# Patient Record
Sex: FEMALE | Race: WHITE | NOT HISPANIC OR LATINO | URBAN - METROPOLITAN AREA
[De-identification: names, ages, dates, MRNs, and addresses within clinical notes are randomized per-mention and may not be internally consistent; named-entity substitution may affect disease eponyms.]

---

## 2017-02-28 ENCOUNTER — OUTPATIENT (OUTPATIENT)
Dept: OUTPATIENT SERVICES | Facility: HOSPITAL | Age: 82
LOS: 1 days | End: 2017-02-28

## 2017-04-05 ENCOUNTER — OUTPATIENT (OUTPATIENT)
Dept: OUTPATIENT SERVICES | Facility: HOSPITAL | Age: 82
LOS: 1 days | End: 2017-04-05

## 2017-04-08 ENCOUNTER — OUTPATIENT (OUTPATIENT)
Dept: OUTPATIENT SERVICES | Facility: HOSPITAL | Age: 82
LOS: 1 days | End: 2017-04-08

## 2017-05-09 ENCOUNTER — OUTPATIENT (OUTPATIENT)
Dept: OUTPATIENT SERVICES | Facility: HOSPITAL | Age: 82
LOS: 1 days | End: 2017-05-09

## 2017-05-11 ENCOUNTER — OUTPATIENT (OUTPATIENT)
Dept: OUTPATIENT SERVICES | Facility: HOSPITAL | Age: 82
LOS: 1 days | End: 2017-05-11

## 2017-05-12 ENCOUNTER — OUTPATIENT (OUTPATIENT)
Dept: OUTPATIENT SERVICES | Facility: HOSPITAL | Age: 82
LOS: 1 days | End: 2017-05-12

## 2017-05-24 ENCOUNTER — OUTPATIENT (OUTPATIENT)
Dept: OUTPATIENT SERVICES | Facility: HOSPITAL | Age: 82
LOS: 1 days | End: 2017-05-24

## 2017-05-25 ENCOUNTER — OUTPATIENT (OUTPATIENT)
Dept: OUTPATIENT SERVICES | Facility: HOSPITAL | Age: 82
LOS: 1 days | End: 2017-05-25

## 2017-05-30 ENCOUNTER — OUTPATIENT (OUTPATIENT)
Dept: OUTPATIENT SERVICES | Facility: HOSPITAL | Age: 82
LOS: 1 days | End: 2017-05-30

## 2017-06-12 ENCOUNTER — OUTPATIENT (OUTPATIENT)
Dept: OUTPATIENT SERVICES | Facility: HOSPITAL | Age: 82
LOS: 1 days | End: 2017-06-12

## 2017-06-13 ENCOUNTER — OUTPATIENT (OUTPATIENT)
Dept: OUTPATIENT SERVICES | Facility: HOSPITAL | Age: 82
LOS: 1 days | End: 2017-06-13

## 2017-06-15 ENCOUNTER — OUTPATIENT (OUTPATIENT)
Dept: OUTPATIENT SERVICES | Facility: HOSPITAL | Age: 82
LOS: 1 days | End: 2017-06-15

## 2017-06-27 ENCOUNTER — OUTPATIENT (OUTPATIENT)
Dept: OUTPATIENT SERVICES | Facility: HOSPITAL | Age: 82
LOS: 1 days | End: 2017-06-27

## 2017-07-14 ENCOUNTER — OUTPATIENT (OUTPATIENT)
Dept: OUTPATIENT SERVICES | Facility: HOSPITAL | Age: 82
LOS: 1 days | End: 2017-07-14

## 2017-07-18 ENCOUNTER — OUTPATIENT (OUTPATIENT)
Dept: OUTPATIENT SERVICES | Facility: HOSPITAL | Age: 82
LOS: 1 days | End: 2017-07-18

## 2017-07-19 ENCOUNTER — APPOINTMENT (OUTPATIENT)
Dept: CARDIOLOGY | Facility: CLINIC | Age: 82
End: 2017-07-19

## 2017-07-19 ENCOUNTER — INPATIENT (INPATIENT)
Facility: HOSPITAL | Age: 82
LOS: 2 days | Discharge: NURSING, REHAB OR LONG-TERM CARE | End: 2017-07-22
Payer: MEDICARE

## 2017-07-19 ENCOUNTER — OUTPATIENT (OUTPATIENT)
Dept: OUTPATIENT SERVICES | Facility: HOSPITAL | Age: 82
LOS: 1 days | End: 2017-07-19

## 2017-07-19 PROCEDURE — 93971 EXTREMITY STUDY: CPT | Mod: 26,RT

## 2017-07-19 PROCEDURE — 71010: CPT | Mod: 26

## 2017-07-19 PROCEDURE — 99285 EMERGENCY DEPT VISIT HI MDM: CPT

## 2017-07-20 ENCOUNTER — OUTPATIENT (OUTPATIENT)
Dept: OUTPATIENT SERVICES | Facility: HOSPITAL | Age: 82
LOS: 1 days | End: 2017-07-20

## 2017-07-20 PROCEDURE — 99233 SBSQ HOSP IP/OBS HIGH 50: CPT

## 2017-07-21 ENCOUNTER — OUTPATIENT (OUTPATIENT)
Dept: OUTPATIENT SERVICES | Facility: HOSPITAL | Age: 82
LOS: 1 days | End: 2017-07-21

## 2017-07-21 PROCEDURE — 99232 SBSQ HOSP IP/OBS MODERATE 35: CPT

## 2017-07-22 ENCOUNTER — OUTPATIENT (OUTPATIENT)
Dept: OUTPATIENT SERVICES | Facility: HOSPITAL | Age: 82
LOS: 1 days | End: 2017-07-22

## 2017-07-22 PROCEDURE — 99232 SBSQ HOSP IP/OBS MODERATE 35: CPT

## 2017-07-25 ENCOUNTER — OUTPATIENT (OUTPATIENT)
Dept: OUTPATIENT SERVICES | Facility: HOSPITAL | Age: 82
LOS: 1 days | End: 2017-07-25

## 2017-07-26 ENCOUNTER — OUTPATIENT (OUTPATIENT)
Dept: OUTPATIENT SERVICES | Facility: HOSPITAL | Age: 82
LOS: 1 days | End: 2017-07-26

## 2017-07-31 ENCOUNTER — APPOINTMENT (OUTPATIENT)
Dept: CARDIOLOGY | Facility: CLINIC | Age: 82
End: 2017-07-31
Payer: MEDICARE

## 2017-07-31 PROCEDURE — 99215 OFFICE O/P EST HI 40 MIN: CPT

## 2017-07-31 PROCEDURE — 93308 TTE F-UP OR LMTD: CPT

## 2017-08-01 ENCOUNTER — OUTPATIENT (OUTPATIENT)
Dept: OUTPATIENT SERVICES | Facility: HOSPITAL | Age: 82
LOS: 1 days | End: 2017-08-01

## 2017-08-15 ENCOUNTER — APPOINTMENT (OUTPATIENT)
Dept: CARDIOLOGY | Facility: CLINIC | Age: 82
End: 2017-08-15
Payer: MEDICARE

## 2017-08-15 PROCEDURE — 99214 OFFICE O/P EST MOD 30 MIN: CPT

## 2017-08-25 ENCOUNTER — OUTPATIENT (OUTPATIENT)
Dept: OUTPATIENT SERVICES | Facility: HOSPITAL | Age: 82
LOS: 1 days | End: 2017-08-25

## 2017-08-29 ENCOUNTER — OUTPATIENT (OUTPATIENT)
Dept: OUTPATIENT SERVICES | Facility: HOSPITAL | Age: 82
LOS: 1 days | End: 2017-08-29

## 2017-08-30 ENCOUNTER — OUTPATIENT (OUTPATIENT)
Dept: OUTPATIENT SERVICES | Facility: HOSPITAL | Age: 82
LOS: 1 days | End: 2017-08-30

## 2017-09-01 ENCOUNTER — OUTPATIENT (OUTPATIENT)
Dept: OUTPATIENT SERVICES | Facility: HOSPITAL | Age: 82
LOS: 1 days | End: 2017-09-01

## 2017-09-07 ENCOUNTER — OUTPATIENT (OUTPATIENT)
Dept: OUTPATIENT SERVICES | Facility: HOSPITAL | Age: 82
LOS: 1 days | End: 2017-09-07

## 2017-09-21 ENCOUNTER — OUTPATIENT (OUTPATIENT)
Dept: OUTPATIENT SERVICES | Facility: HOSPITAL | Age: 82
LOS: 1 days | End: 2017-09-21

## 2017-09-27 ENCOUNTER — APPOINTMENT (OUTPATIENT)
Dept: CARDIOLOGY | Facility: CLINIC | Age: 82
End: 2017-09-27
Payer: MEDICARE

## 2017-09-27 PROCEDURE — 99214 OFFICE O/P EST MOD 30 MIN: CPT

## 2017-09-28 ENCOUNTER — OUTPATIENT (OUTPATIENT)
Dept: OUTPATIENT SERVICES | Facility: HOSPITAL | Age: 82
LOS: 1 days | End: 2017-09-28

## 2017-10-06 ENCOUNTER — OUTPATIENT (OUTPATIENT)
Dept: OUTPATIENT SERVICES | Facility: HOSPITAL | Age: 82
LOS: 1 days | End: 2017-10-06

## 2017-10-18 ENCOUNTER — OUTPATIENT (OUTPATIENT)
Dept: OUTPATIENT SERVICES | Facility: HOSPITAL | Age: 82
LOS: 1 days | End: 2017-10-18

## 2017-10-26 ENCOUNTER — OUTPATIENT (OUTPATIENT)
Dept: OUTPATIENT SERVICES | Facility: HOSPITAL | Age: 82
LOS: 1 days | End: 2017-10-26

## 2017-10-31 ENCOUNTER — APPOINTMENT (OUTPATIENT)
Dept: CARDIOLOGY | Facility: CLINIC | Age: 82
End: 2017-10-31
Payer: MEDICARE

## 2017-10-31 PROCEDURE — 99214 OFFICE O/P EST MOD 30 MIN: CPT

## 2017-11-02 PROCEDURE — 93224 XTRNL ECG REC UP TO 48 HRS: CPT

## 2017-11-14 ENCOUNTER — OUTPATIENT (OUTPATIENT)
Dept: OUTPATIENT SERVICES | Facility: HOSPITAL | Age: 82
LOS: 1 days | End: 2017-11-14

## 2017-11-16 ENCOUNTER — OUTPATIENT (OUTPATIENT)
Dept: OUTPATIENT SERVICES | Facility: HOSPITAL | Age: 82
LOS: 1 days | End: 2017-11-16

## 2017-11-23 ENCOUNTER — OUTPATIENT (OUTPATIENT)
Dept: OUTPATIENT SERVICES | Facility: HOSPITAL | Age: 82
LOS: 1 days | End: 2017-11-23

## 2017-11-28 ENCOUNTER — OUTPATIENT (OUTPATIENT)
Dept: OUTPATIENT SERVICES | Facility: HOSPITAL | Age: 82
LOS: 1 days | End: 2017-11-28

## 2018-01-04 ENCOUNTER — APPOINTMENT (OUTPATIENT)
Dept: CARDIOLOGY | Facility: CLINIC | Age: 83
End: 2018-01-04

## 2018-01-23 ENCOUNTER — OUTPATIENT (OUTPATIENT)
Dept: OUTPATIENT SERVICES | Facility: HOSPITAL | Age: 83
LOS: 1 days | End: 2018-01-23

## 2018-01-23 ENCOUNTER — APPOINTMENT (OUTPATIENT)
Dept: CARDIOLOGY | Facility: CLINIC | Age: 83
End: 2018-01-23

## 2018-02-13 ENCOUNTER — APPOINTMENT (OUTPATIENT)
Dept: CARDIOLOGY | Facility: CLINIC | Age: 83
End: 2018-02-13
Payer: MEDICARE

## 2018-02-13 ENCOUNTER — RECORD ABSTRACTING (OUTPATIENT)
Age: 83
End: 2018-02-13

## 2018-02-13 VITALS
HEART RATE: 72 BPM | DIASTOLIC BLOOD PRESSURE: 60 MMHG | BODY MASS INDEX: 17.93 KG/M2 | HEIGHT: 64 IN | OXYGEN SATURATION: 97 % | SYSTOLIC BLOOD PRESSURE: 110 MMHG | RESPIRATION RATE: 18 BRPM | WEIGHT: 105 LBS

## 2018-02-13 DIAGNOSIS — I48.2 CHRONIC ATRIAL FIBRILLATION: ICD-10-CM

## 2018-02-13 DIAGNOSIS — R07.89 OTHER CHEST PAIN: ICD-10-CM

## 2018-02-13 DIAGNOSIS — Z78.9 OTHER SPECIFIED HEALTH STATUS: ICD-10-CM

## 2018-02-13 DIAGNOSIS — R94.31 ABNORMAL ELECTROCARDIOGRAM [ECG] [EKG]: ICD-10-CM

## 2018-02-13 DIAGNOSIS — Z86.79 PERSONAL HISTORY OF OTHER DISEASES OF THE CIRCULATORY SYSTEM: ICD-10-CM

## 2018-02-13 DIAGNOSIS — Z87.891 PERSONAL HISTORY OF NICOTINE DEPENDENCE: ICD-10-CM

## 2018-02-13 DIAGNOSIS — Z82.49 FAMILY HISTORY OF ISCHEMIC HEART DISEASE AND OTHER DISEASES OF THE CIRCULATORY SYSTEM: ICD-10-CM

## 2018-02-13 DIAGNOSIS — R06.02 SHORTNESS OF BREATH: ICD-10-CM

## 2018-02-13 DIAGNOSIS — Z87.898 PERSONAL HISTORY OF OTHER SPECIFIED CONDITIONS: ICD-10-CM

## 2018-02-13 DIAGNOSIS — I50.32 CHRONIC DIASTOLIC (CONGESTIVE) HEART FAILURE: ICD-10-CM

## 2018-02-13 DIAGNOSIS — Z86.73 PERSONAL HISTORY OF TRANSIENT ISCHEMIC ATTACK (TIA), AND CEREBRAL INFARCTION W/OUT RESIDUAL DEFICITS: ICD-10-CM

## 2018-02-13 PROCEDURE — 99214 OFFICE O/P EST MOD 30 MIN: CPT

## 2018-02-13 RX ORDER — ACETAMINOPHEN 325 MG/1
325 TABLET ORAL
Refills: 0 | Status: ACTIVE | COMMUNITY

## 2018-02-13 RX ORDER — INSULIN GLARGINE 100 [IU]/ML
100 INJECTION, SOLUTION SUBCUTANEOUS
Refills: 0 | Status: ACTIVE | COMMUNITY

## 2018-02-13 RX ORDER — MELATONIN 3 MG
3 CAPSULE ORAL
Refills: 0 | Status: ACTIVE | COMMUNITY

## 2018-02-13 RX ORDER — INSULIN LISPRO 100 [IU]/ML
100 INJECTION, SOLUTION INTRAVENOUS; SUBCUTANEOUS
Refills: 0 | Status: ACTIVE | COMMUNITY

## 2018-02-13 RX ORDER — BECLOMETHASONE DIPROPIONATE HFA 80 UG/1
80 AEROSOL, METERED RESPIRATORY (INHALATION)
Refills: 0 | Status: ACTIVE | COMMUNITY

## 2018-02-13 RX ORDER — APIXABAN 5 MG/1
5 TABLET, FILM COATED ORAL TWICE DAILY
Refills: 0 | Status: ACTIVE | COMMUNITY

## 2018-02-13 RX ORDER — FUROSEMIDE 20 MG/1
20 TABLET ORAL
Refills: 0 | Status: ACTIVE | COMMUNITY

## 2018-02-13 RX ORDER — POTASSIUM CHLORIDE 750 MG/1
10 TABLET, FILM COATED, EXTENDED RELEASE ORAL
Refills: 0 | Status: ACTIVE | COMMUNITY

## 2018-02-27 ENCOUNTER — OUTPATIENT (OUTPATIENT)
Dept: OUTPATIENT SERVICES | Facility: HOSPITAL | Age: 83
LOS: 1 days | End: 2018-02-27

## 2018-03-12 ENCOUNTER — APPOINTMENT (OUTPATIENT)
Dept: CARDIOLOGY | Facility: CLINIC | Age: 83
End: 2018-03-12

## 2018-03-22 ENCOUNTER — APPOINTMENT (OUTPATIENT)
Dept: CARDIOLOGY | Facility: CLINIC | Age: 83
End: 2018-03-22
Payer: MEDICARE

## 2018-03-22 PROCEDURE — 93306 TTE W/DOPPLER COMPLETE: CPT

## 2018-04-12 ENCOUNTER — OUTPATIENT (OUTPATIENT)
Dept: OUTPATIENT SERVICES | Facility: HOSPITAL | Age: 83
LOS: 1 days | End: 2018-04-12

## 2018-04-21 ENCOUNTER — OUTPATIENT (OUTPATIENT)
Dept: OUTPATIENT SERVICES | Facility: HOSPITAL | Age: 83
LOS: 1 days | End: 2018-04-21

## 2018-04-23 ENCOUNTER — OUTPATIENT (OUTPATIENT)
Dept: OUTPATIENT SERVICES | Facility: HOSPITAL | Age: 83
LOS: 1 days | End: 2018-04-23

## 2018-04-23 ENCOUNTER — INPATIENT (INPATIENT)
Facility: HOSPITAL | Age: 83
LOS: 2 days | Discharge: NURSING, REHAB OR LONG-TERM CARE | End: 2018-04-26
Payer: MEDICARE

## 2018-04-23 PROCEDURE — 99223 1ST HOSP IP/OBS HIGH 75: CPT

## 2018-04-23 PROCEDURE — 71045 X-RAY EXAM CHEST 1 VIEW: CPT | Mod: 26

## 2018-04-23 PROCEDURE — 99285 EMERGENCY DEPT VISIT HI MDM: CPT

## 2018-04-24 ENCOUNTER — OUTPATIENT (OUTPATIENT)
Dept: OUTPATIENT SERVICES | Facility: HOSPITAL | Age: 83
LOS: 1 days | End: 2018-04-24

## 2018-04-24 PROCEDURE — 71045 X-RAY EXAM CHEST 1 VIEW: CPT | Mod: 26

## 2018-04-25 ENCOUNTER — APPOINTMENT (OUTPATIENT)
Dept: CARDIOLOGY | Facility: CLINIC | Age: 83
End: 2018-04-25

## 2018-04-25 PROCEDURE — 99233 SBSQ HOSP IP/OBS HIGH 50: CPT

## 2018-04-26 ENCOUNTER — OUTPATIENT (OUTPATIENT)
Dept: OUTPATIENT SERVICES | Facility: HOSPITAL | Age: 83
LOS: 1 days | End: 2018-04-26

## 2018-04-27 ENCOUNTER — OUTPATIENT (OUTPATIENT)
Dept: OUTPATIENT SERVICES | Facility: HOSPITAL | Age: 83
LOS: 1 days | End: 2018-04-27

## 2018-04-27 ENCOUNTER — INPATIENT (INPATIENT)
Facility: HOSPITAL | Age: 83
LOS: 0 days | Discharge: ANOTHER HOSPITAL | End: 2018-04-28
Payer: MEDICARE

## 2018-04-27 PROCEDURE — 71045 X-RAY EXAM CHEST 1 VIEW: CPT | Mod: 26

## 2018-04-27 PROCEDURE — 76937 US GUIDE VASCULAR ACCESS: CPT | Mod: 26

## 2018-04-27 PROCEDURE — 99285 EMERGENCY DEPT VISIT HI MDM: CPT | Mod: 25

## 2018-04-27 PROCEDURE — 99222 1ST HOSP IP/OBS MODERATE 55: CPT

## 2018-04-27 PROCEDURE — 36000 PLACE NEEDLE IN VEIN: CPT

## 2018-04-28 ENCOUNTER — OUTPATIENT (OUTPATIENT)
Dept: OUTPATIENT SERVICES | Facility: HOSPITAL | Age: 83
LOS: 1 days | End: 2018-04-28

## 2018-04-28 ENCOUNTER — INPATIENT (INPATIENT)
Facility: HOSPITAL | Age: 83
LOS: 3 days | Discharge: STILL ADMITTED OR ADMINISTRATIVE | DRG: 242 | End: 2018-05-02
Attending: INTERNAL MEDICINE | Admitting: HOSPITALIST
Payer: MEDICARE

## 2018-04-28 VITALS
WEIGHT: 108.91 LBS | OXYGEN SATURATION: 97 % | TEMPERATURE: 98 F | RESPIRATION RATE: 18 BRPM | HEART RATE: 49 BPM | DIASTOLIC BLOOD PRESSURE: 99 MMHG | SYSTOLIC BLOOD PRESSURE: 191 MMHG | HEIGHT: 64 IN

## 2018-04-28 DIAGNOSIS — R00.1 BRADYCARDIA, UNSPECIFIED: ICD-10-CM

## 2018-04-28 DIAGNOSIS — N18.9 CHRONIC KIDNEY DISEASE, UNSPECIFIED: ICD-10-CM

## 2018-04-28 DIAGNOSIS — I48.91 UNSPECIFIED ATRIAL FIBRILLATION: ICD-10-CM

## 2018-04-28 DIAGNOSIS — I16.1 HYPERTENSIVE EMERGENCY: ICD-10-CM

## 2018-04-28 LAB
ALBUMIN SERPL ELPH-MCNC: 3.3 G/DL — SIGNIFICANT CHANGE UP (ref 3.3–5.2)
ALP SERPL-CCNC: 74 U/L — SIGNIFICANT CHANGE UP (ref 40–120)
ALT FLD-CCNC: 28 U/L — SIGNIFICANT CHANGE UP
ANION GAP SERPL CALC-SCNC: 14 MMOL/L — SIGNIFICANT CHANGE UP (ref 5–17)
AST SERPL-CCNC: 28 U/L — SIGNIFICANT CHANGE UP
BASOPHILS # BLD AUTO: 0 K/UL — SIGNIFICANT CHANGE UP (ref 0–0.2)
BASOPHILS NFR BLD AUTO: 0.1 % — SIGNIFICANT CHANGE UP (ref 0–2)
BILIRUB SERPL-MCNC: 0.8 MG/DL — SIGNIFICANT CHANGE UP (ref 0.4–2)
BUN SERPL-MCNC: 38 MG/DL — HIGH (ref 8–20)
CALCIUM SERPL-MCNC: 9.2 MG/DL — SIGNIFICANT CHANGE UP (ref 8.6–10.2)
CHLORIDE SERPL-SCNC: 103 MMOL/L — SIGNIFICANT CHANGE UP (ref 98–107)
CO2 SERPL-SCNC: 19 MMOL/L — LOW (ref 22–29)
CREAT SERPL-MCNC: 1.15 MG/DL — SIGNIFICANT CHANGE UP (ref 0.5–1.3)
EOSINOPHIL # BLD AUTO: 0.2 K/UL — SIGNIFICANT CHANGE UP (ref 0–0.5)
EOSINOPHIL NFR BLD AUTO: 2.6 % — SIGNIFICANT CHANGE UP (ref 0–6)
GLUCOSE SERPL-MCNC: 135 MG/DL — HIGH (ref 70–115)
HCT VFR BLD CALC: 35.7 % — LOW (ref 37–47)
HGB BLD-MCNC: 11.5 G/DL — LOW (ref 12–16)
LYMPHOCYTES # BLD AUTO: 1.6 K/UL — SIGNIFICANT CHANGE UP (ref 1–4.8)
LYMPHOCYTES # BLD AUTO: 20.4 % — SIGNIFICANT CHANGE UP (ref 20–55)
MAGNESIUM SERPL-MCNC: 2.4 MG/DL — SIGNIFICANT CHANGE UP (ref 1.6–2.6)
MCHC RBC-ENTMCNC: 30 PG — SIGNIFICANT CHANGE UP (ref 27–31)
MCHC RBC-ENTMCNC: 32.2 G/DL — SIGNIFICANT CHANGE UP (ref 32–36)
MCV RBC AUTO: 93.2 FL — SIGNIFICANT CHANGE UP (ref 81–99)
MONOCYTES # BLD AUTO: 0.9 K/UL — HIGH (ref 0–0.8)
MONOCYTES NFR BLD AUTO: 11 % — HIGH (ref 3–10)
NEUTROPHILS # BLD AUTO: 5.2 K/UL — SIGNIFICANT CHANGE UP (ref 1.8–8)
NEUTROPHILS NFR BLD AUTO: 65.6 % — SIGNIFICANT CHANGE UP (ref 37–73)
NT-PROBNP SERPL-SCNC: HIGH PG/ML (ref 0–300)
PLATELET # BLD AUTO: 153 K/UL — SIGNIFICANT CHANGE UP (ref 150–400)
POTASSIUM SERPL-MCNC: 4.8 MMOL/L — SIGNIFICANT CHANGE UP (ref 3.5–5.3)
POTASSIUM SERPL-SCNC: 4.8 MMOL/L — SIGNIFICANT CHANGE UP (ref 3.5–5.3)
PROT SERPL-MCNC: 5.6 G/DL — LOW (ref 6.6–8.7)
RBC # BLD: 3.83 M/UL — LOW (ref 4.4–5.2)
RBC # FLD: 16.4 % — HIGH (ref 11–15.6)
SODIUM SERPL-SCNC: 136 MMOL/L — SIGNIFICANT CHANGE UP (ref 135–145)
T4 AB SER-ACNC: 6.3 UG/DL — SIGNIFICANT CHANGE UP (ref 4.5–12)
TROPONIN T SERPL-MCNC: 0.03 NG/ML — SIGNIFICANT CHANGE UP (ref 0–0.06)
TSH SERPL-MCNC: 8.43 UIU/ML — HIGH (ref 0.27–4.2)
WBC # BLD: 7.9 K/UL — SIGNIFICANT CHANGE UP (ref 4.8–10.8)
WBC # FLD AUTO: 7.9 K/UL — SIGNIFICANT CHANGE UP (ref 4.8–10.8)

## 2018-04-28 PROCEDURE — 99223 1ST HOSP IP/OBS HIGH 75: CPT

## 2018-04-28 PROCEDURE — 93971 EXTREMITY STUDY: CPT | Mod: 26,RT

## 2018-04-28 PROCEDURE — 70450 CT HEAD/BRAIN W/O DYE: CPT | Mod: 26

## 2018-04-28 PROCEDURE — 99285 EMERGENCY DEPT VISIT HI MDM: CPT

## 2018-04-28 RX ORDER — PANTOPRAZOLE SODIUM 20 MG/1
40 TABLET, DELAYED RELEASE ORAL
Qty: 0 | Refills: 0 | Status: DISCONTINUED | OUTPATIENT
Start: 2018-04-28 | End: 2018-05-02

## 2018-04-28 RX ORDER — NITROGLYCERIN 6.5 MG
1 CAPSULE, EXTENDED RELEASE ORAL DAILY
Qty: 0 | Refills: 0 | Status: DISCONTINUED | OUTPATIENT
Start: 2018-04-28 | End: 2018-04-29

## 2018-04-28 RX ORDER — HYDRALAZINE HCL 50 MG
50 TABLET ORAL EVERY 8 HOURS
Qty: 0 | Refills: 0 | Status: DISCONTINUED | OUTPATIENT
Start: 2018-04-28 | End: 2018-04-29

## 2018-04-28 RX ORDER — HYDRALAZINE HCL 50 MG
10 TABLET ORAL EVERY 4 HOURS
Qty: 0 | Refills: 0 | Status: DISCONTINUED | OUTPATIENT
Start: 2018-04-28 | End: 2018-05-02

## 2018-04-28 RX ORDER — ASPIRIN/CALCIUM CARB/MAGNESIUM 324 MG
325 TABLET ORAL DAILY
Qty: 0 | Refills: 0 | Status: DISCONTINUED | OUTPATIENT
Start: 2018-04-28 | End: 2018-04-28

## 2018-04-28 RX ORDER — HYDRALAZINE HCL 50 MG
10 TABLET ORAL ONCE
Qty: 0 | Refills: 0 | Status: COMPLETED | OUTPATIENT
Start: 2018-04-28 | End: 2018-04-28

## 2018-04-28 RX ADMIN — Medication 10 MILLIGRAM(S): at 19:40

## 2018-04-28 NOTE — H&P ADULT - PROBLEM SELECTOR PLAN 2
will give hydralazine 50 mg tid , iv hydralzine prn   nitropaste 1 inch to Chest wall   will obtain CT of head r/o bleed

## 2018-04-28 NOTE — ED PROVIDER NOTE - PMH
Atrial fibrillation    Bradycardia    CHF (congestive heart failure)    CKD (chronic kidney disease)    CVA (cerebral vascular accident)    Depression    DM (diabetes mellitus)    HLD (hyperlipidemia)    HTN (hypertension)    Syst congestive heart failure with reduced LV function, NYHA class 1

## 2018-04-28 NOTE — ED ADULT NURSE NOTE - CHIEF COMPLAINT QUOTE
Pt BIBA, transfer from [facility] for pacemaker placement. Dr. Birmingham is accepting cardio, Yaya accepting hospitalist. Pt has refused PPM in the past and has MOLST form with her upon ED arrival. Denies any chest pain or sob, respirations even and unlabored. Pt hypertensive in triage, rec'd 25mg hydralazine PO and Nitro paste prior to transfer to [facility].

## 2018-04-28 NOTE — ED PROVIDER NOTE - MEDICAL DECISION MAKING DETAILS
discussed with health care proxy and family; unclear to me at this time if a PPM is in patient's best interest, does not sound to me as though family has clear understanding of all implications of DNR/DNI status or of the potential for comfort care; I explained these items in detail to them, and they will need to consider further a PPM procedure; will admit to monitored setting, treat BP; cards consulted

## 2018-04-28 NOTE — ED ADULT NURSE NOTE - ED STAT RN HANDOFF DETAILS
report given to kerry ulrich, pt stable for transport on cm with , pt transported with rn bedside, KERRY ulrich expressing concern and requesting this rn to call admitting md d/t pt meeting admission criteria for MICU, md yusuf notified, pt to remain on 4brk per MD Yusuf

## 2018-04-28 NOTE — H&P ADULT - ASSESSMENT
89 yo female with h/o Atrial fibrillation , HTN , CHF , dementia transferred  from [facility] for symptomatic  anemia for PPM insertion by DR Mcmanus on monday , pt is with hypertensive emergency   DNR/DNI per SERINA

## 2018-04-28 NOTE — ED PROVIDER NOTE - OBJECTIVE STATEMENT
89 yo female transferred from [place] for symptomatic bradycardia 89 yo female transferred from [facility] for symptomatic bradycardia; patient in long term nursing care facility, has apparently had several bouts of symptomatic bradycardia in the past, but has refused PPM up to this point; apparently symptoms became more profound over last week or so, was admitted to [facility], and now family is agreeable to PPM; patient is DNR DNI as per MOLST form; also with some dementia, health care proxy able to be reached for easily; patient on arrival to our facility has no subjective complaints; accepted for transfer by hospitalist and Dr Mcmanus EP

## 2018-04-28 NOTE — CONSULT NOTE ADULT - SUBJECTIVE AND OBJECTIVE BOX
[facility] Practice                                                        Office: [address] [address]                                                       Telephone: [phone]. Fax:[phone]    Patient is a 89y old  Female who presents with a chief complaint of symptomatic bradycardia for PPM (28 Apr 2018 19:26)      HPI: 89 yo female with h/o CVA , HFPEF (per records, LVEF 55% and mild to mod MR on echo 3/2018), Atrial fibrillation on eliquis , wheelchair bound due to neuropathy, HTN transferred from [facility] for PPM on monday per dr Mcmanus , she was admitted to the hospital  last week apparently for bradycardia and  elevated BP , she had refused PPM and discharged to NH ( she is there long term ) , However pt had recurrent symptoms weakness dizziness and was admitted to the hospital yesterday for symptomatic bardycardia . HRs in the 40s with hypertensive urgency.  Now agreeable to PPM.   Pt is with -190's given hydralazine 25 mg po and Nitropaste ointment . Pt is forgetful has mild dementia per niece who is at the bedside , she has MOLTS form signed by her granddaughter Tiffanie .     Vital Signs Last 24 Hrs  T(C): 36.7 (28 Apr 2018 19:26), Max: 36.7 (28 Apr 2018 17:47)  T(F): 98 (28 Apr 2018 19:26), Max: 98 (28 Apr 2018 17:47)  HR: 44 (28 Apr 2018 19:26) (44 - 49)  BP: 219/92 (28 Apr 2018 19:26) (191/99 - 219/92)  BP(mean): --  RR: 18 (28 Apr 2018 19:26) (18 - 18)  SpO2: 98% (28 Apr 2018 19:26) (97% - 98%) (28 Apr 2018 19:26)    PAST MEDICAL & SURGICAL HISTORY:  CKD (chronic kidney disease)  Bradycardia  DM (diabetes mellitus)  CVA (cerebral vascular accident)  HTN (hypertension)  CHF (congestive heart failure)  Atrial fibrillation  HLD (hyperlipidemia)  Depression  Syst congestive heart failure with reduced LV function, NYHA class 1  No significant past surgical history      Allergies  No Known Allergies  Intolerances    MEDICATIONS  (STANDING):  hydrALAZINE 50 milliGRAM(s) Oral every 8 hours  nitroglycerin    2% Ointment 1 Inch(s) Transdermal daily  pantoprazole    Tablet 40 milliGRAM(s) Oral before breakfast    MEDICATIONS  (PRN):  cloNIDine 0.1 milliGRAM(s) Oral two times a day PRN BP>160/100  hydrALAZINE Injectable 10 milliGRAM(s) IV Push every 4 hours PRN for SBP > 160 DBP > 95      FAMILY HISTORY:  No pertinent family history in first degree relatives    SOCIAL HISTORY: Denies tobacco/etoh/illicit drug use, lives in SNF, wheelchair bound    PREVIOUS DIAGNOSTIC TESTING:  NONE IN OUR SYSTEM    REVIEW OF SYSTEMS:  CONSTITUTIONAL: [-]fever   [-] weight loss   [+] fatigue  EYES: [-]  eye pain   [-] visual disturbances      [-]  discharge  ENMT:  [-]  difficulty hearing,   [-]  tinnitus   [-] vertigo    [-]  sinus or throat pain  NECK: [-]  pain or stiffness  RESPIRATORY: [-]  cough 	[-] wheezing 	[-]  hemoptysis 		[-]   Shortness of Breath  CARDIOVASCULAR: [-]  chest pain	[-] palpitations		[-]  passing out 		[+] dizziness 	[-]  leg swelling  		[-]  PND 	[-] orthopnea  GASTROINTESTINAL: [-]  abdominal pain		[-]nausea	[-] vomiting	[-]  hematemesis 	[-]  diarrhea  	[-] constipation 		[-]  melena 	[-] hematochezia.  GENITOURINARY: [-] dysuria	[+] frequency	[-] hematuria	[-]  incontinence  NEUROLOGICAL: [-]  headaches		[-] memory loss 	[-]  loss of strength  			[-]  numbness/tingling 	[-]  tremors  SKIN: [-]  itching 	[-] rashes 	[-]  lesions   LYMPH Nodes: [-] enlarged glands  ENDOCRINE: [-] heat or cold intolerance 	[-]   hair loss  MUSCULOSKELETAL: [-] joint pain  [-] joint swelling	[-]  muscle, back, or extremity pain  PSYCHIATRIC: [-]  depression	[-] anxiety	[-] mood swings		[-]  difficulty sleeping   HEME: [-]  easy bruising 	[-]  bleeding   ALLERY AND IMMUNOLOGIC: [-]  hives or eczema	      Vital Signs Last 24 Hrs  T(C): 36.7 (28 Apr 2018 19:26), Max: 36.7 (28 Apr 2018 17:47)  T(F): 98 (28 Apr 2018 19:26), Max: 98 (28 Apr 2018 17:47)  HR: 46 (28 Apr 2018 21:20) (42 - 49)  BP: 183/77 (28 Apr 2018 21:20) (183/77 - 219/92)  BP(mean): --  RR: 18 (28 Apr 2018 19:26) (18 - 18)  SpO2: 98% (28 Apr 2018 19:26) (97% - 98%)  Daily Height in cm: 162.56 (28 Apr 2018 17:47)    Daily   I&O's Detail        PHYSICAL EXAM:  Appearance: well nourished, NAD	  HEENT:   Normal oral mucosa, PERRL, EOMI, sclera non-icteric	  Lymphatic: No cervical lymphadenopathy  Cardiovascular: Normal S1 S2, No JVD,III/VI systolic murmur, No carotid bruits,trace bilateral LE edema  Respiratory: trace basilar crackles  Psychiatry: calm, intermittently agitated  Gastrointestinal:  Soft, Non-tender, + BS, no bruits	  Skin: No rashes, No ecchymoses, No cyanosis, Dry  Neurologic: Grossly non-focal with full strength in all four extremities  Extremities: Normal range of motion, No clubbing, cyanosis or edema  Vascular: Peripheral pulses palpable 2+ bilaterally, warm    INTERPRETATION OF TELEMETRY: afib 40s    ECG (tracing reviewed by me): afib 67 bpm,  frequent PVCs - prior ECG with RBBB    LABS:  PENDING  REVIEWED LABS FROM [facility]          BNP  RADIOLOGY & ADDITIONAL STUDIES:  CXR (image reviewed by me): pending [facility] Practice                                                        Office: [address] [address]                                                       Telephone: [phone]. Fax:[phone]    Patient is a 89y old  Female who presents with a chief complaint of symptomatic bradycardia for PPM (28 Apr 2018 19:26)      HPI: 89 yo female with h/o CVA , HFPEF (per records, LVEF 55% and mild to mod MR on echo 3/2018), Atrial fibrillation on eliquis , wheelchair bound due to neuropathy, HTN transferred from [facility] for PPM on monday per dr Mcmanus , she was admitted to the hospital  last week apparently for bradycardia and  elevated BP , she had refused PPM and discharged to NH ( she is there long term ) , However pt had recurrent symptoms weakness dizziness and was admitted to the hospital yesterday for symptomatic bardycardia . HRs in the 40s with hypertensive urgency.  Now agreeable to PPM.   Pt is with -190's given hydralazine 25 mg po and Nitropaste ointment . Pt is forgetful has mild dementia per niece who is at the bedside , she has MOLTS form signed by her granddaughter Tiffanie .     Vital Signs Last 24 Hrs  T(C): 36.7 (28 Apr 2018 19:26), Max: 36.7 (28 Apr 2018 17:47)  T(F): 98 (28 Apr 2018 19:26), Max: 98 (28 Apr 2018 17:47)  HR: 44 (28 Apr 2018 19:26) (44 - 49)  BP: 219/92 (28 Apr 2018 19:26) (191/99 - 219/92)  BP(mean): --  RR: 18 (28 Apr 2018 19:26) (18 - 18)  SpO2: 98% (28 Apr 2018 19:26) (97% - 98%) (28 Apr 2018 19:26)    PAST MEDICAL & SURGICAL HISTORY:  CKD (chronic kidney disease)  Bradycardia  DM (diabetes mellitus)  CVA (cerebral vascular accident)  HTN (hypertension)  CHF (congestive heart failure)  Atrial fibrillation  HLD (hyperlipidemia)  Depression  Syst congestive heart failure with reduced LV function, NYHA class 1  No significant past surgical history      Allergies  No Known Allergies  Intolerances    MEDICATIONS  (STANDING):  hydrALAZINE 50 milliGRAM(s) Oral every 8 hours  nitroglycerin    2% Ointment 1 Inch(s) Transdermal daily  pantoprazole    Tablet 40 milliGRAM(s) Oral before breakfast    MEDICATIONS  (PRN):  cloNIDine 0.1 milliGRAM(s) Oral two times a day PRN BP>160/100  hydrALAZINE Injectable 10 milliGRAM(s) IV Push every 4 hours PRN for SBP > 160 DBP > 95      FAMILY HISTORY:  No pertinent family history in first degree relatives    SOCIAL HISTORY: Denies tobacco/etoh/illicit drug use, lives in SNF, wheelchair bound    PREVIOUS DIAGNOSTIC TESTING:  NONE IN OUR SYSTEM    REVIEW OF SYSTEMS:  CONSTITUTIONAL: [-]fever   [-] weight loss   [+] fatigue  EYES: [-]  eye pain   [-] visual disturbances      [-]  discharge  ENMT:  [-]  difficulty hearing,   [-]  tinnitus   [-] vertigo    [-]  sinus or throat pain  NECK: [-]  pain or stiffness  RESPIRATORY: [-]  cough 	[-] wheezing 	[-]  hemoptysis 		[-]   Shortness of Breath  CARDIOVASCULAR: [-]  chest pain	[-] palpitations		[-]  passing out 		[+] dizziness 	[-]  leg swelling  		[-]  PND 	[-] orthopnea  GASTROINTESTINAL: [-]  abdominal pain		[-]nausea	[-] vomiting	[-]  hematemesis 	[-]  diarrhea  	[-] constipation 		[-]  melena 	[-] hematochezia.  GENITOURINARY: [-] dysuria	[+] frequency	[-] hematuria	[-]  incontinence  NEUROLOGICAL: [-]  headaches		[-] memory loss 	[-]  loss of strength  			[-]  numbness/tingling 	[-]  tremors  SKIN: [-]  itching 	[-] rashes 	[-]  lesions   LYMPH Nodes: [-] enlarged glands  ENDOCRINE: [-] heat or cold intolerance 	[-]   hair loss  MUSCULOSKELETAL: [-] joint pain  [-] joint swelling	[-]  muscle, back, or extremity pain  PSYCHIATRIC: [-]  depression	[-] anxiety	[-] mood swings		[-]  difficulty sleeping   HEME: [-]  easy bruising 	[-]  bleeding   ALLERY AND IMMUNOLOGIC: [-]  hives or eczema	      Vital Signs Last 24 Hrs  T(C): 36.7 (28 Apr 2018 19:26), Max: 36.7 (28 Apr 2018 17:47)  T(F): 98 (28 Apr 2018 19:26), Max: 98 (28 Apr 2018 17:47)  HR: 46 (28 Apr 2018 21:20) (42 - 49)  BP: 183/77 (28 Apr 2018 21:20) (183/77 - 219/92)  BP(mean): --  RR: 18 (28 Apr 2018 19:26) (18 - 18)  SpO2: 98% (28 Apr 2018 19:26) (97% - 98%)  Daily Height in cm: 162.56 (28 Apr 2018 17:47)    Daily   I&O's Detail        PHYSICAL EXAM:  Appearance: well nourished, NAD	  HEENT:   Normal oral mucosa, PERRL, EOMI, sclera non-icteric	  Lymphatic: No cervical lymphadenopathy  Cardiovascular: Normal S1 S2, No JVD,III/VI systolic murmur, No carotid bruits,trace bilateral LE edema  Respiratory: trace basilar crackles  Psychiatry: calm, intermittently agitated  Gastrointestinal:  Soft, Non-tender, + BS, no bruits	  Skin: No rashes, No ecchymoses, No cyanosis, Dry  Neurologic: Grossly non-focal with full strength in all four extremities  Extremities: Normal range of motion, No clubbing, cyanosis or edema  Vascular: Peripheral pulses palpable 2+ bilaterally, warm    INTERPRETATION OF TELEMETRY: afib 40s    ECG (tracing reviewed by me): afib  40s, RBBB    LABS:  PENDING  REVIEWED LABS FROM [facility]          BNP  RADIOLOGY & ADDITIONAL STUDIES:  CXR (image reviewed by me): pending

## 2018-04-28 NOTE — ED ADULT NURSE REASSESSMENT NOTE - NS ED NURSE REASSESS COMMENT FT1
Report received from off going RN, charting as noted. Patient A&Ox4, denies any pain or discomfort. Respirations even & unlabored, denies any numbness or tingling. Cardiac monitor in place, sinus peter. Denies any chest pain, shortness of breath, nausea or dizziness. Saline lock in place, patent, negative s/s phlebitis or infiltration. Ecchymosis to left forearm, stated hurts to touch. Family at bedside. Plan of care discussed, all questions answered. Patient hypertensive, Hydralazine administered as ordered. Will monitor.

## 2018-04-28 NOTE — ED ADULT TRIAGE NOTE - CHIEF COMPLAINT QUOTE
Pt BIBA, transfer from [facility] for pacemaker placement. Dr. Birmingham is accepting cardio, Yaya accepting hospitalist. Pt has refused PPM in the past and has MOLST form with her upon ED arrival. Denies any chest pain or sob, respirations even and unlabored. Pt hypertensive in triage, rec'd 25mg hydralazine PO and Nitro paste prior to transfer to [facility].

## 2018-04-28 NOTE — H&P ADULT - HISTORY OF PRESENT ILLNESS
89 yo female with h/o CVA , Atrial fibrillation on eliquis , HTN transfered from [facility] for PPM on monday per dr Mcmanus , she was admitted to the hospital  last week apparently for bradycardia and  elevated BP , she had refused PPM and discharged to NH ( she is there long term ) , However pt had recurrent symptoms weakness dizziness and was admitted to the hospital yesterday for symptomatic bardycardia . Pt is with -190's given hydralazine 25 mg po and Nitropaste ointment . Pt is forgetful has mild dementia per niece who is at the bedside , she has MOLTS form signed by her granddaughter Tiffanie .   Vital Signs Last 24 Hrs  T(C): 36.7 (28 Apr 2018 19:26), Max: 36.7 (28 Apr 2018 17:47)  T(F): 98 (28 Apr 2018 19:26), Max: 98 (28 Apr 2018 17:47)  HR: 44 (28 Apr 2018 19:26) (44 - 49)  BP: 219/92 (28 Apr 2018 19:26) (191/99 - 219/92)  BP(mean): --  RR: 18 (28 Apr 2018 19:26) (18 - 18)  SpO2: 98% (28 Apr 2018 19:26) (97% - 98%)

## 2018-04-29 DIAGNOSIS — N18.3 CHRONIC KIDNEY DISEASE, STAGE 3 (MODERATE): ICD-10-CM

## 2018-04-29 DIAGNOSIS — I50.9 HEART FAILURE, UNSPECIFIED: ICD-10-CM

## 2018-04-29 DIAGNOSIS — R94.6 ABNORMAL RESULTS OF THYROID FUNCTION STUDIES: ICD-10-CM

## 2018-04-29 PROCEDURE — 36000 PLACE NEEDLE IN VEIN: CPT

## 2018-04-29 PROCEDURE — 99233 SBSQ HOSP IP/OBS HIGH 50: CPT

## 2018-04-29 PROCEDURE — 71045 X-RAY EXAM CHEST 1 VIEW: CPT | Mod: 26

## 2018-04-29 RX ORDER — FUROSEMIDE 40 MG
40 TABLET ORAL ONCE
Qty: 0 | Refills: 0 | Status: COMPLETED | OUTPATIENT
Start: 2018-04-29 | End: 2018-04-29

## 2018-04-29 RX ORDER — ENOXAPARIN SODIUM 100 MG/ML
50 INJECTION SUBCUTANEOUS ONCE
Qty: 0 | Refills: 0 | Status: COMPLETED | OUTPATIENT
Start: 2018-04-29 | End: 2018-04-29

## 2018-04-29 RX ORDER — NITROGLYCERIN 6.5 MG
1 CAPSULE, EXTENDED RELEASE ORAL DAILY
Qty: 0 | Refills: 0 | Status: DISCONTINUED | OUTPATIENT
Start: 2018-04-29 | End: 2018-04-29

## 2018-04-29 RX ORDER — HYDRALAZINE HCL 50 MG
50 TABLET ORAL EVERY 6 HOURS
Qty: 0 | Refills: 0 | Status: DISCONTINUED | OUTPATIENT
Start: 2018-04-29 | End: 2018-05-01

## 2018-04-29 RX ORDER — NITROGLYCERIN 6.5 MG
1 CAPSULE, EXTENDED RELEASE ORAL DAILY
Qty: 0 | Refills: 0 | Status: DISCONTINUED | OUTPATIENT
Start: 2018-04-29 | End: 2018-05-01

## 2018-04-29 RX ORDER — FUROSEMIDE 40 MG
40 TABLET ORAL DAILY
Qty: 0 | Refills: 0 | Status: DISCONTINUED | OUTPATIENT
Start: 2018-04-29 | End: 2018-04-29

## 2018-04-29 RX ORDER — LEVOTHYROXINE SODIUM 125 MCG
75 TABLET ORAL DAILY
Qty: 0 | Refills: 0 | Status: DISCONTINUED | OUTPATIENT
Start: 2018-04-29 | End: 2018-05-02

## 2018-04-29 RX ADMIN — Medication 50 MILLIGRAM(S): at 23:17

## 2018-04-29 RX ADMIN — ENOXAPARIN SODIUM 50 MILLIGRAM(S): 100 INJECTION SUBCUTANEOUS at 18:02

## 2018-04-29 RX ADMIN — Medication 1 INCH(S): at 21:28

## 2018-04-29 RX ADMIN — Medication 40 MILLIGRAM(S): at 17:59

## 2018-04-29 RX ADMIN — Medication 50 MILLIGRAM(S): at 05:15

## 2018-04-29 RX ADMIN — Medication 1 INCH(S): at 09:05

## 2018-04-29 RX ADMIN — Medication 0.1 MILLIGRAM(S): at 04:29

## 2018-04-29 RX ADMIN — Medication 50 MILLIGRAM(S): at 17:59

## 2018-04-29 RX ADMIN — Medication 40 MILLIGRAM(S): at 13:22

## 2018-04-29 RX ADMIN — Medication 10 MILLIGRAM(S): at 18:02

## 2018-04-29 RX ADMIN — Medication 50 MILLIGRAM(S): at 12:39

## 2018-04-29 RX ADMIN — Medication 50 MILLIGRAM(S): at 00:14

## 2018-04-29 RX ADMIN — PANTOPRAZOLE SODIUM 40 MILLIGRAM(S): 20 TABLET, DELAYED RELEASE ORAL at 08:32

## 2018-04-29 RX ADMIN — Medication 75 MICROGRAM(S): at 09:05

## 2018-04-29 RX ADMIN — Medication 10 MILLIGRAM(S): at 16:20

## 2018-04-29 NOTE — ED ADULT NURSE REASSESSMENT NOTE - NS ED NURSE REASSESS COMMENT FT1
Spoke to karrie (healthcare proxy), advised patient has a bed on 4BRK and will be going upstairs after room is clean.

## 2018-04-29 NOTE — ED ADULT NURSE REASSESSMENT NOTE - NS ED NURSE REASSESS COMMENT FT1
Report received from offgoing RN, chart as noted, pt a&ox3 @ this time with intermittent confusion @ baseline, pt peter on cm HR 40's, #20g iv to LUE noted, flushed per policy. Bruising noted to BUE, redness noted to Right arm, RR even and unlabored, on cm, safety maintained, pt updated on bed assignment verbalized understanding, denies cp denies sob pt resting comfortably on stretcher

## 2018-04-29 NOTE — PROGRESS NOTE ADULT - SUBJECTIVE AND OBJECTIVE BOX
[facility] Practice                                                        Office: [address] [address]                                                       Telephone: [phone]. Fax:[phone]      CARDIOLOGY PROGRESS NOTE   ([facility])    Subjective: Patient seen and examined at bedside.  Denies chest pain, shortness of breath, palpitations, dizziness, nausea, bleeding. Hypertension difficult to control.       CURRENT MEDICATIONS  cloNIDine 0.1 milliGRAM(s) Oral two times a day PRN  furosemide   Injectable 40 milliGRAM(s) IV Push daily  hydrALAZINE 50 milliGRAM(s) Oral every 6 hours  hydrALAZINE Injectable 10 milliGRAM(s) IV Push every 4 hours PRN  nitroglycerin    2% Ointment 1 Inch(s) Transdermal daily  pantoprazole    Tablet 40 milliGRAM(s) Oral before breakfast  levothyroxine 75 MICROGram(s) Oral daily  	  TELEMETRY: afib 30s-50s  Vitals:  T(C): 36.6 (04-29-18 @ 08:18), Max: 36.7 (04-28-18 @ 17:47)  HR: 46 (04-29-18 @ 13:22) (36 - 49)  BP: 178/78 (04-29-18 @ 13:22) (174/74 - 219/92)  RR: 18 (04-29-18 @ 13:22) (18 - 20)  SpO2: 99% (04-29-18 @ 13:22) (97% - 100%)  Wt(kg): --  I&O's Summary    Height (cm): 162.56 (04-28 @ 17:47)  Weight (kg): 49.4 (04-28 @ 17:47)  BMI (kg/m2): 18.7 (04-28 @ 17:47)  BSA (m2): 1.51 (04-28 @ 17:47)  Daily T(C): 36.6 (04-29-18 @ 08:18), Max: 36.7 (04-28-18 @ 17:47)  HR: 46 (04-29-18 @ 13:22) (36 - 49)  BP: 178/78 (04-29-18 @ 13:22) (174/74 - 219/92)  RR: 18 (04-29-18 @ 13:22) (18 - 20)  SpO2: 99% (04-29-18 @ 13:22) (97% - 100%)  Wt(kg): --    Daily Height (cm): 162.56 (04-28 @ 17:47)  Weight (kg): 49.4 (04-28 @ 17:47)  BMI (kg/m2): 18.7 (04-28 @ 17:47)  BSA (m2): 1.51 (04-28 @ 17:47)    PHYSICAL EXAM:  Appearance: well nourished, NAD	  HEENT:   Normal oral mucosa, PERRL, EOMI, sclera non-icteric	  Lymphatic: No cervical lymphadenopathy  Cardiovascular: Normal S1 S2, No JVD,III/VI systolic murmur, No carotid bruits, trace bilateral LE edema  Respiratory: trace bibasilar crackles  Psychiatry: calm,   Gastrointestinal:  Soft, Non-tender, + BS, no bruits	  Skin: No rashes, No ecchymoses, No cyanosis, Dry  Neurologic: Grossly non-focal with full strength in all four extremities  Extremities: Normal range of motion, No clubbing, cyanosis or edema  Vascular: Peripheral pulses palpable 2+ bilaterally, warm    DIAGNOSTIC TESTING:    Echocardiogram pending                       11.5   7.9   )-----------( 153      ( 28 Apr 2018 23:02 )             35.7     04-28    136  |  103  |  38.0<H>  ----------------------------<  135<H>  4.8   |  19.0<L>  |  1.15    Ca    9.2      28 Apr 2018 23:02  Mg     2.4     04-28    TPro  5.6<L>  /  Alb  3.3  /  TBili  0.8  /  DBili  x   /  AST  28  /  ALT  28  /  AlkPhos  74  04-28    BNP: Serum Pro-Brain Natriuretic Peptide: 49874 pg/mL (04-28 @ 23:02)    TSH: Thyroid Stimulating Hormone, Serum: 8.43 uIU/mL (04-28 @ 23:02)

## 2018-04-29 NOTE — PROGRESS NOTE ADULT - ASSESSMENT
89 yo female with h/o CVA , HFPEF (per records, LVEF 55% and mild to mod MR on echo 3/2018), Atrial fibrillation on eliquis , wheelchair bound due to neuropathy, HTN transferred from [facility] for PPM on monday per dr Mcmanus , she was admitted to the hospital  last week apparently for bradycardia and  elevated BP , she had refused PPM and discharged to NH ( she is there long term ) , However pt had recurrent symptoms weakness dizziness and was admitted to the hospital yesterday for symptomatic bardycardia . HRs in the 40s with hypertensive urgency.  Now agreeable to PPM.      # hypertensive urgency  # bradycardia  # hypothyroidism - TSH 8  # atrial fibrillation  # CKD  # mod MR    - started on po hydralazine with IV prn   - with nitropaste  - if BP remains uncontrolled, consider nitroglycerin gtt or IV enalaprilat  - agree with clonidine, hydralazine, lasix  - monitor on telemetry  - hold NOAC, transition to IV heparin (in preparation for PPM implant)  - EP service to evaluate her in the am to assess if ready for PPM  - started on levothyroxine - consider endocrinology consult  - NPO post midnight just in case  - echo pending  - no need for TVP at this time      Thank you for allowing me to participate in care of your patient.     D/W Dr. Javier    EP service to resume care for patient tomorrow 89 yo female with h/o CVA , HFPEF (per records, LVEF 55% and mild to mod MR on echo 3/2018), Atrial fibrillation on eliquis , wheelchair bound due to neuropathy, HTN transferred from [facility] for PPM on monday per dr Mcmanus , she was admitted to the hospital  last week apparently for bradycardia and  elevated BP , she had refused PPM and discharged to NH ( she is there long term ) , However pt had recurrent symptoms weakness dizziness and was admitted to the hospital yesterday for symptomatic bardycardia . HRs in the 40s with hypertensive urgency.  Now agreeable to PPM.      # hypertensive urgency  # bradycardia  # hypothyroidism - TSH 8  # atrial fibrillation  # CKD  # mod MR    - started on po hydralazine with IV prn   - with nitropaste  - if BP remains uncontrolled, consider nitroglycerin gtt or IV enalaprilat  - agree with clonidine, hydralazine, lasix  - monitor on telemetry  - hold NOAC, transition to IV heparin (in preparation for PPM implant)  - EP service to evaluate her in the am to assess if ready for PPM  - started on levothyroxine - consider endocrinology consult  - NPO post midnight just in case  - echo pending  - no need for TVP at this time      Thank you for allowing me to participate in care of your patient.     D/W Dr. Javier

## 2018-04-29 NOTE — PROGRESS NOTE ADULT - PROBLEM SELECTOR PLAN 1
cont to monitor , hemodynamically stable   cardiology input appreciated '  EPS evaluation in am for PPM

## 2018-04-29 NOTE — PROGRESS NOTE ADULT - SUBJECTIVE AND OBJECTIVE BOX
Internal Medicine Hospitalist Progress Note      cc shortness of breath , awake alert oriented   Pt is still with bradycardia and elevated BP overnight , poor peripheral access   seen and examined this morning ,discussed with Dr Birmingham       ROS: as above, all remaining ROS are negative.       BACKGROUND:  MEDICATIONS  (STANDING):  furosemide   Injectable 40 milliGRAM(s) IV Push daily  hydrALAZINE 50 milliGRAM(s) Oral every 6 hours  levothyroxine 75 MICROGram(s) Oral daily  nitroglycerin    2% Ointment 1 Inch(s) Transdermal daily  pantoprazole    Tablet 40 milliGRAM(s) Oral before breakfast    MEDICATIONS  (PRN):  cloNIDine 0.1 milliGRAM(s) Oral two times a day PRN BP>160/100  hydrALAZINE Injectable 10 milliGRAM(s) IV Push every 4 hours PRN for SBP > 160 DBP > 95    Allergies    No Known Allergies    Intolerances            VITALS:  Vital Signs Last 24 Hrs  T(C): 36.6 (29 Apr 2018 08:18), Max: 36.7 (28 Apr 2018 17:47)  T(F): 97.8 (29 Apr 2018 08:18), Max: 98 (28 Apr 2018 17:47)  HR: 46 (29 Apr 2018 13:22) (36 - 49)  BP: 178/78 (29 Apr 2018 13:22) (174/74 - 219/92)  BP(mean): --  RR: 18 (29 Apr 2018 13:22) (18 - 20)  SpO2: 99% (29 Apr 2018 13:22) (97% - 100%) Daily Height in cm: 162.56 (28 Apr 2018 17:47)    Daily   CAPILLARY BLOOD GLUCOSE      PHYSICAL EXAM:      Constitutional: awake alert oriented , no distress     Neck: supple , no JVD     Back: limited rOM , decreased strenght     Respiratory: clear to auscultation bilaterally poor inspiratory effort     Cardiovascular: bradycardic S1/S2 , regular , + murmur     Gastrointestinal: soft no tenderness BS +     Extremities: no edema , no calf tenderness , left arm swelling     Neurological: AXO3 , normal speech , left sided arm weakness due to old stroke per niece     Skin: no rash normal color           LABS:                        11.5   7.9   )-----------( 153      ( 28 Apr 2018 23:02 )             35.7     04-28    136  |  103  |  38.0<H>  ----------------------------<  135<H>  4.8   |  19.0<L>  |  1.15    Ca    9.2      28 Apr 2018 23:02  Mg     2.4     04-28  Thyroid Stimulating Hormone, Serum: 8.43 uIU/mL (04.28.18 @ 23:02)        TPro  5.6<L>  /  Alb  3.3  /  TBili  0.8  /  DBili  x   /  AST  28  /  ALT  28  /  AlkPhos  74  04-28        Radiology :

## 2018-04-29 NOTE — ED ADULT NURSE REASSESSMENT NOTE - NS ED NURSE REASSESS COMMENT FT1
Patient ripped saline lock out of arm, bleeding controlled. Patient refused to allow another saline lock to be placed, stating "I just want to go to [facility] license of [facility], leave me alone already! Where's Ria? Ira knows my wishes." DNR active. Patient educated on necessity for IV access in hospital, stated "I don't care! I don't want it!" Patient ripped saline lock out of arm, bleeding controlled. Patient refused to allow another saline lock to be placed, stating "I just want to go to [facility], leave me alone already! Where's Ira? Ira knows my wishes." DNR active. Patient educated on necessity for IV access in hospital, stated "I don't care! I don't want it!" Dr. Nicolle palumbo.

## 2018-04-29 NOTE — ED ADULT NURSE REASSESSMENT NOTE - NS ED NURSE REASSESS COMMENT FT1
Patient hypertensive, IV access obtained by house PA, pt. given PO hydralizine for elevated bp, remains on cardiac monitor, will continue to reassess.

## 2018-04-29 NOTE — PROGRESS NOTE ADULT - ASSESSMENT
89 yo female with h/o CVA, atrial fibrillation , CHF transfered from [facility] for symptomatic bradycardia for PPM insertion   pt was with BP over 200 on arrival

## 2018-04-29 NOTE — PROCEDURE NOTE - NSPROCDETAILS_GEN_ALL_CORE
dressing applied/flushes easily/secured in place/sterile technique, catheter placed/location identified, draped/prepped, sterile technique used/blood seen on insertion

## 2018-04-29 NOTE — ED ADULT NURSE REASSESSMENT NOTE - NS ED NURSE REASSESS COMMENT FT1
KERRY Preston on 4brk expressing concern for pt safety admission to [facility] with symptomatic bradycardia diagnosis, md vargas contacted by this rn md states per cardiology consult pt does nto meet micu requirements @ this time and will be evaluated for pacemaker in AM by am team, pt stable for transport on cm hr 40's

## 2018-04-30 LAB
ANION GAP SERPL CALC-SCNC: 17 MMOL/L — SIGNIFICANT CHANGE UP (ref 5–17)
BUN SERPL-MCNC: 40 MG/DL — HIGH (ref 8–20)
CALCIUM SERPL-MCNC: 9 MG/DL — SIGNIFICANT CHANGE UP (ref 8.6–10.2)
CHLORIDE SERPL-SCNC: 105 MMOL/L — SIGNIFICANT CHANGE UP (ref 98–107)
CO2 SERPL-SCNC: 21 MMOL/L — LOW (ref 22–29)
CREAT SERPL-MCNC: 1.34 MG/DL — HIGH (ref 0.5–1.3)
GLUCOSE BLDC GLUCOMTR-MCNC: 98 MG/DL — SIGNIFICANT CHANGE UP (ref 70–99)
GLUCOSE SERPL-MCNC: 115 MG/DL — SIGNIFICANT CHANGE UP (ref 70–115)
HCT VFR BLD CALC: 36.1 % — LOW (ref 37–47)
HGB BLD-MCNC: 11.6 G/DL — LOW (ref 12–16)
INR BLD: 1.17 RATIO — HIGH (ref 0.88–1.16)
MAGNESIUM SERPL-MCNC: 2.2 MG/DL — SIGNIFICANT CHANGE UP (ref 1.6–2.6)
MCHC RBC-ENTMCNC: 30 PG — SIGNIFICANT CHANGE UP (ref 27–31)
MCHC RBC-ENTMCNC: 32.1 G/DL — SIGNIFICANT CHANGE UP (ref 32–36)
MCV RBC AUTO: 93.3 FL — SIGNIFICANT CHANGE UP (ref 81–99)
PLATELET # BLD AUTO: 162 K/UL — SIGNIFICANT CHANGE UP (ref 150–400)
POTASSIUM SERPL-MCNC: 4.2 MMOL/L — SIGNIFICANT CHANGE UP (ref 3.5–5.3)
POTASSIUM SERPL-SCNC: 4.2 MMOL/L — SIGNIFICANT CHANGE UP (ref 3.5–5.3)
PROTHROM AB SERPL-ACNC: 12.9 SEC — HIGH (ref 9.8–12.7)
RBC # BLD: 3.87 M/UL — LOW (ref 4.4–5.2)
RBC # FLD: 16.6 % — HIGH (ref 11–15.6)
SODIUM SERPL-SCNC: 143 MMOL/L — SIGNIFICANT CHANGE UP (ref 135–145)
T3FREE SERPL-MCNC: 2.67 PG/ML — SIGNIFICANT CHANGE UP (ref 1.8–4.6)
T4 FREE SERPL-MCNC: 1.8 NG/DL — SIGNIFICANT CHANGE UP (ref 0.9–1.8)
WBC # BLD: 7.9 K/UL — SIGNIFICANT CHANGE UP (ref 4.8–10.8)
WBC # FLD AUTO: 7.9 K/UL — SIGNIFICANT CHANGE UP (ref 4.8–10.8)

## 2018-04-30 PROCEDURE — 93010 ELECTROCARDIOGRAM REPORT: CPT

## 2018-04-30 PROCEDURE — 99233 SBSQ HOSP IP/OBS HIGH 50: CPT

## 2018-04-30 PROCEDURE — 70450 CT HEAD/BRAIN W/O DYE: CPT | Mod: 26

## 2018-04-30 RX ORDER — CEFAZOLIN SODIUM 1 G
2000 VIAL (EA) INJECTION
Qty: 0 | Refills: 0 | Status: COMPLETED | OUTPATIENT
Start: 2018-04-30 | End: 2018-05-01

## 2018-04-30 RX ORDER — SODIUM CHLORIDE 9 MG/ML
1000 INJECTION, SOLUTION INTRAVENOUS
Qty: 0 | Refills: 0 | Status: DISCONTINUED | OUTPATIENT
Start: 2018-04-30 | End: 2018-05-01

## 2018-04-30 RX ORDER — HYDRALAZINE HCL 50 MG
5 TABLET ORAL
Qty: 0 | Refills: 0 | Status: COMPLETED | OUTPATIENT
Start: 2018-04-30 | End: 2018-04-30

## 2018-04-30 RX ORDER — CEFAZOLIN SODIUM 1 G
2000 VIAL (EA) INJECTION
Qty: 0 | Refills: 0 | Status: DISCONTINUED | OUTPATIENT
Start: 2018-04-30 | End: 2018-04-30

## 2018-04-30 RX ADMIN — Medication 5 MILLIGRAM(S): at 21:39

## 2018-04-30 RX ADMIN — Medication 10 MILLIGRAM(S): at 08:41

## 2018-04-30 RX ADMIN — Medication 50 MILLIGRAM(S): at 20:25

## 2018-04-30 RX ADMIN — Medication 1 INCH(S): at 11:42

## 2018-04-30 RX ADMIN — Medication 50 MILLIGRAM(S): at 06:15

## 2018-04-30 RX ADMIN — Medication 10 MILLIGRAM(S): at 06:15

## 2018-04-30 RX ADMIN — PANTOPRAZOLE SODIUM 40 MILLIGRAM(S): 20 TABLET, DELAYED RELEASE ORAL at 06:15

## 2018-04-30 RX ADMIN — Medication 50 MILLIGRAM(S): at 11:42

## 2018-04-30 RX ADMIN — Medication 75 MICROGRAM(S): at 06:15

## 2018-04-30 RX ADMIN — Medication 5 MILLIGRAM(S): at 22:11

## 2018-04-30 NOTE — PROGRESS NOTE ADULT - PROBLEM SELECTOR PLAN 1
cont to monitor , hemodynamically stable   cardiology input appreciated '  EPS evaluation in am for PPM cont to monitor , hemodynamically stable   cardiology input appreciated  EPS evaluation appreciated, plan for PPM today  Pacer pads on patient.

## 2018-04-30 NOTE — PROGRESS NOTE ADULT - SUBJECTIVE AND OBJECTIVE BOX
cc: shortness of breath     Patient seen and examined at bedside, no acute overnight events.   Patient being evaluated for bradycardia and elevated BP.   Patient continues to have bradycardia and states symptoms are only present with exertion.   Denies any chest pain, palpitations, nausea, vomiting or headaches.       ROS: as above, all remaining ROS are negative.       BACKGROUND:  MEDICATIONS  (STANDING):  enalapril 10 milliGRAM(s) Oral daily  hydrALAZINE 50 milliGRAM(s) Oral every 6 hours  levothyroxine 75 MICROGram(s) Oral daily  nitroglycerin    2% Ointment 1 Inch(s) Transdermal daily  pantoprazole    Tablet 40 milliGRAM(s) Oral before breakfast    MEDICATIONS  (PRN):  cloNIDine 0.1 milliGRAM(s) Oral two times a day PRN BP>160/100  hydrALAZINE Injectable 10 milliGRAM(s) IV Push every 4 hours PRN for SBP > 160 DBP > 95    Allergies    No Known Allergies    Intolerances      Vital Signs Last 24 Hrs  T(C): 36.4 (30 Apr 2018 08:22), Max: 37 (30 Apr 2018 04:00)  T(F): 97.6 (30 Apr 2018 08:22), Max: 98.6 (30 Apr 2018 04:00)  HR: 38 (30 Apr 2018 10:17) (30 - 46)  BP: 151/54 (30 Apr 2018 10:17) (93/78 - 200/78)  BP(mean): 84 (30 Apr 2018 04:00) (84 - 84)  RR: 20 (30 Apr 2018 10:17) (11 - 20)  SpO2: 100% (30 Apr 2018 10:17) (98% - 100%)    PHYSICAL EXAM:    Constitutional: awake alert oriented , NAD  Neck: supple , no JVD   Back: limited ROM , decreased strength    Respiratory: clear to auscultation bilaterally poor inspiratory effort     Cardiovascular: bradycardic S1/S2 , regular , + murmur     Gastrointestinal: soft no tenderness BS +     Extremities: no edema , no calf tenderness , left arm swelling     Neurological: AXO3 , normal speech , left sided arm weakness due to old stroke per niece     Skin: no rash normal color           LABS:                        11.5   7.9   )-----------( 153      ( 28 Apr 2018 23:02 )             35.7     04-28    136  |  103  |  38.0<H>  ----------------------------<  135<H>  4.8   |  19.0<L>  |  1.15    Ca    9.2      28 Apr 2018 23:02  Mg     2.4     04-28  Thyroid Stimulating Hormone, Serum: 8.43 uIU/mL (04.28.18 @ 23:02)        TPro  5.6<L>  /  Alb  3.3  /  TBili  0.8  /  DBili  x   /  AST  28  /  ALT  28  /  AlkPhos  74  04-28        Radiology : cc: shortness of breath     Patient seen and examined at bedside, no acute overnight events.   Patient being evaluated for bradycardia and elevated BP.   Patient continues to have bradycardia and states symptoms are only present with exertion.   Denies any chest pain, palpitations, nausea, vomiting or headaches.       ROS: as above, all remaining ROS are negative.       BACKGROUND:  MEDICATIONS  (STANDING):  enalapril 10 milliGRAM(s) Oral daily  hydrALAZINE 50 milliGRAM(s) Oral every 6 hours  levothyroxine 75 MICROGram(s) Oral daily  nitroglycerin    2% Ointment 1 Inch(s) Transdermal daily  pantoprazole    Tablet 40 milliGRAM(s) Oral before breakfast    MEDICATIONS  (PRN):  cloNIDine 0.1 milliGRAM(s) Oral two times a day PRN BP>160/100  hydrALAZINE Injectable 10 milliGRAM(s) IV Push every 4 hours PRN for SBP > 160 DBP > 95    Allergies    No Known Allergies    Intolerances      Vital Signs Last 24 Hrs  T(C): 36.4 (30 Apr 2018 08:22), Max: 37 (30 Apr 2018 04:00)  T(F): 97.6 (30 Apr 2018 08:22), Max: 98.6 (30 Apr 2018 04:00)  HR: 38 (30 Apr 2018 10:17) (30 - 46)  BP: 151/54 (30 Apr 2018 10:17) (93/78 - 200/78)  BP(mean): 84 (30 Apr 2018 04:00) (84 - 84)  RR: 20 (30 Apr 2018 10:17) (11 - 20)  SpO2: 100% (30 Apr 2018 10:17) (98% - 100%)    PHYSICAL EXAM:    Constitutional: awake alert oriented , NAD  Neck: supple , no JVD   Back: limited ROM , decreased strength  Respiratory: clear to auscultation bilaterally poor inspiratory effort   Cardiovascular: bradycardic rate, regular rhythm. +S1/S2 , + murmur   Gastrointestinal: +BS, soft, NT, ND  Extremities: no edema , no calf tenderness , left arm swelling   Neurological: AXO3 , normal speech , left sided arm weakness due to old stroke per niece   Skin: no rash normal color       LABS:                        11.6   7.9   )-----------( 162      ( 30 Apr 2018 09:16 )             36.1       30 Apr 2018 09:16    143    |  105    |  40.0   ----------------------------<  115    4.2     |  21.0   |  1.34     Ca    9.0        30 Apr 2018 09:16  Mg     2.2       30 Apr 2018 09:16    TPro  5.6    /  Alb  3.3    /  TBili  0.8    /  DBili  x      /  AST  28     /  ALT  28     /  AlkPhos  74     28 Apr 2018 23:02      Radiology :    < from: Xray Chest 1 View- PORTABLE-Urgent (04.29.18 @ 17:31) >  Impression: No active pulmonary disease. Mild cardiomegaly.    < end of copied text >    < from: CT Head No Cont (04.28.18 @ 22:14) >    IMPRESSION:    moderate periventricular white matter ischemia with   multiple old cortical infarctions in the LEFT frontal, LEFT parietal and   RIGHT frontal and temporal lobes. Probable 1 cm calcified meningioma is   seen in the anterior RIGHT posterior fossa without significant underlying   mass effect.      < end of copied text > cc: shortness of breath now better   seen for bradycardia and high uncontrolled hypertension   no acute overnight events. monitor reviewed still with bradycardia       ROS: as above, all remaining ROS are negative.       BACKGROUND:  MEDICATIONS  (STANDING):  enalapril 10 milliGRAM(s) Oral daily  hydrALAZINE 50 milliGRAM(s) Oral every 6 hours  levothyroxine 75 MICROGram(s) Oral daily  nitroglycerin    2% Ointment 1 Inch(s) Transdermal daily  pantoprazole    Tablet 40 milliGRAM(s) Oral before breakfast    MEDICATIONS  (PRN):  cloNIDine 0.1 milliGRAM(s) Oral two times a day PRN BP>160/100  hydrALAZINE Injectable 10 milliGRAM(s) IV Push every 4 hours PRN for SBP > 160 DBP > 95    Allergies    No Known Allergies    Intolerances      Vital Signs Last 24 Hrs  T(C): 36.4 (30 Apr 2018 08:22), Max: 37 (30 Apr 2018 04:00)  T(F): 97.6 (30 Apr 2018 08:22), Max: 98.6 (30 Apr 2018 04:00)  HR: 38 (30 Apr 2018 10:17) (30 - 46)  BP: 151/54 (30 Apr 2018 10:17) (93/78 - 200/78)  BP(mean): 84 (30 Apr 2018 04:00) (84 - 84)  RR: 20 (30 Apr 2018 10:17) (11 - 20)  SpO2: 100% (30 Apr 2018 10:17) (98% - 100%)    PHYSICAL EXAM:    Constitutional: awake alert oriented , NAD  Neck: supple , no JVD   Back: limited ROM , decreased strength  Respiratory: clear to auscultation bilaterally poor inspiratory effort   Cardiovascular: bradycardic rate, regular rhythm. +S1/S2 , + murmur   Gastrointestinal: +BS, soft, NT, ND  Extremities: no edema , no calf tenderness , left arm swelling       LABS:                        11.6   7.9   )-----------( 162      ( 30 Apr 2018 09:16 )             36.1       30 Apr 2018 09:16    143    |  105    |  40.0   ----------------------------<  115    4.2     |  21.0   |  1.34     Ca    9.0        30 Apr 2018 09:16  Mg     2.2       30 Apr 2018 09:16    TPro  5.6    /  Alb  3.3    /  TBili  0.8    /  DBili  x      /  AST  28     /  ALT  28     /  AlkPhos  74     28 Apr 2018 23:02      Radiology :    < from: Xray Chest 1 View- PORTABLE-Urgent (04.29.18 @ 17:31) >  Impression: No active pulmonary disease. Mild cardiomegaly.    < end of copied text >    < from: CT Head No Cont (04.28.18 @ 22:14) >    IMPRESSION:    moderate periventricular white matter ischemia with   multiple old cortical infarctions in the LEFT frontal, LEFT parietal and   RIGHT frontal and temporal lobes. Probable 1 cm calcified meningioma is   seen in the anterior RIGHT posterior fossa without significant underlying   mass effect.      < end of copied text >

## 2018-04-30 NOTE — PROGRESS NOTE ADULT - PROBLEM SELECTOR PLAN 3
likely  new dx of hypothyroidsm and in the setting of bradycardia will start levothyroxine likely  new dx of hypothyroidsm  Started on levothyroxine 75mcg PO daily

## 2018-04-30 NOTE — PROGRESS NOTE ADULT - PROBLEM SELECTOR PLAN 2
cont to monitor closely , hydralazine , clonidine prn and nitropaste   adjust treatment as needed Improving  Will continue to monitor closely , hydralazine , clonidine prn and nitropaste   adjust treatment as needed

## 2018-04-30 NOTE — CONSULT NOTE ADULT - ASSESSMENT
89 yo female with h/o CVA , Atrial fibrillation on eliquis , HTN transferred from [facility] for consideration of PPM . The patient admitted to the hospital  last week to [facility] apparently for bradycardia and  elevated BP , she had refused PPM and discharged to NH ( she is there long term ) , However pt had recurrent symptoms weakness dizziness and was admitted to the hospital  for symptomatic bradycardia . A 2-D echo  revealed normal LVEF 55%; no sig AS documented.  and telemetry showed persistent HR 30-40 in AF even while awake. Pt is with -190's given hydralazine 25 mg po and Nitropaste ointment . Pt is forgetful has mild dementia per niece who is at the bedside.  primary data reviewed. Agree with PPM for bradycardia support    recc    Keep NPO  2-D echo  IV in left arm  Hold AV rianna agents  Endocrine eval for elevated TSH, check FT4 t3  informed consent obtained and MOLST DNR /DNI reversed for procedure
89 yo female with h/o CVA , HFPEF (per records, LVEF 55% and mild to mod MR on echo 3/2018), Atrial fibrillation on eliquis , wheelchair bound due to neuropathy, HTN transferred from [facility] for PPM on monday per dr Mcmanus , she was admitted to the hospital  last week apparently for bradycardia and  elevated BP , she had refused PPM and discharged to NH ( she is there long term ) , However pt had recurrent symptoms weakness dizziness and was admitted to the hospital yesterday for symptomatic bardycardia . HRs in the 40s with hypertensive urgency.  Now agreeable to PPM.        # bradycardia  # atrial fibrillation  # hypertensive urgency  # CKD  # mod MR    - started on po hydralazine with IV prn   - with nitropaste  - if BP remains uncontrolled, consider nitroglycerin gtt  - monitor on telemetry  - transition to IV heparin (in preparation for PPM implant on Monday)  - NPO post midnight prior to procedure  - echo  - no need for TVP at this time      Thank you for allowing me to participate in care of your patient.   Will follow  D/W Dr. Javier

## 2018-04-30 NOTE — CHART NOTE - NSCHARTNOTEFT_GEN_A_CORE
Called by family to assess patient.   Patient with mild confusion and repetitive words. Stable since my am exam.   Patient able to state why she is here, that she is going to have PPM placed today.     Awake, alert and oriented to person and place however not time.   Follows commands. No focal deficits.    A/P:  Appears to have sundowning picture vs delirium given underlying dementia.   -Previous CT negative for Stroke  -Will repeat CT head, add IVF at 75cc/hr  -Hold diuresis for now.     Discussed with Dr Card, agrees with plan.

## 2018-04-30 NOTE — CONSULT NOTE ADULT - SUBJECTIVE AND OBJECTIVE BOX
[facility] CARDIOLOGY/EP                                                        [facility]                                                             Office: [address] [address]                                                            Telephone: [phone]. Fax:[phone]            Patient is a 89y old  Female who presents with a chief complaint of symptomatic bradycardia for PPM (2018 19:26)      HPI:  89 yo female with h/o CVA , Atrial fibrillation on eliquis , HTN transferred from [facility] for consideration of PPM . The patient admitted to the hospital  last week to [facility] apparently for bradycardia and  elevated BP , she had refused PPM and discharged to NH ( she is there long term ) , However pt had recurrent symptoms weakness dizziness and was admitted to the hospital  for symptomatic bradycardia . A 2-D echo  revealed normal LVEF 55%; no sig AS documented.  and telemetry showed persistent HR 30-40 in AF even while awake. Pt is with -190's given hydralazine 25 mg po and Nitropaste ointment . Pt is forgetful has mild dementia per niece who is at the bedside.    ROS LTD secondary to cognitive impairment  Co-morbid: (-) DM, (_) CVA, (_) COPD (-) PE (-) DVT (-) Bleeding or clotting disorders  ECG: AF with ns st twa  no phenotypic evidence of Brs, HCM ,LQTS      Vital Signs Last 24 Hrs  T(C): 36.7 (2018 19:26), Max: 36.7 (2018 17:47)  T(F): 98 (2018 19:26), Max: 98 (2018 17:47)  HR: 44 (2018 19:26) (44 - 49)  BP: 219/92 (2018 19:26) (191/99 - 219/92)  BP(mean): --  RR: 18 (2018 19:26) (18 - 18)  SpO2: 98% (2018 19:26) (97% - 98%) (2018 19:26)      PAST MEDICAL & SURGICAL HISTORY:  CKD (chronic kidney disease)  Bradycardia  DM (diabetes mellitus)  CVA (cerebral vascular accident)  HTN (hypertension)  CHF (congestive heart failure)  Atrial fibrillation  HLD (hyperlipidemia)  Depression  Syst congestive heart failure with reduced LV function, NYHA class 1  No significant past surgical history      PREVIOUS DIAGNOSTIC TESTING:      Allergies    No Known Allergies    Intolerances        MEDICATIONS  (STANDING):  enalapril 10 milliGRAM(s) Oral daily  hydrALAZINE 50 milliGRAM(s) Oral every 6 hours  levothyroxine 75 MICROGram(s) Oral daily  nitroglycerin    2% Ointment 1 Inch(s) Transdermal daily  pantoprazole    Tablet 40 milliGRAM(s) Oral before breakfast    MEDICATIONS  (PRN):  cloNIDine 0.1 milliGRAM(s) Oral two times a day PRN BP>160/100  hydrALAZINE Injectable 10 milliGRAM(s) IV Push every 4 hours PRN for SBP > 160 DBP > 95      FAMILY HISTORY:  No pertinent family history in first degree relatives      SOCIAL HISTORY:Lives with family    CIGARETTES:None    ALCOHOL:None    REVIEW OF SYSTEMS:  CONSTITUTIONAL: No fever, weight loss, or fatigue  EYES: No eye pain, visual disturbances, or discharge  ENMT:  No difficulty hearing, tinnitus, vertigo; No sinus or throat pain  NECK: No pain or stiffness  RESPIRATORY: No cough, wheezing, chills or hemoptysis; No Shortness of Breath  CARDIOVASCULAR: No chest pain, palpitations, passing out, dizziness, or leg swelling  GASTROINTESTINAL: No abdominal or epigastric pain. No nausea, vomiting, or hematemesis; No diarrhea or constipation. No melena or hematochezia.  GENITOURINARY: No dysuria, frequency, hematuria, or incontinence  NEUROLOGICAL: No headaches, memory loss, loss of strength, numbness, or tremors  SKIN: No itching, burning, rashes, or lesions   LYMPH Nodes: No enlarged glands  ENDOCRINE: No heat or cold intolerance; No hair loss  MUSCULOSKELETAL: No joint pain or swelling; No muscle, back, or extremity pain  PSYCHIATRIC: No depression, anxiety, mood swings, or difficulty sleeping  HEME/LYMPH: No easy bruising, or bleeding gums  ALLERY AND IMMUNOLOGIC: No hives or eczema	    Vital Signs Last 24 Hrs  T(C): 36.4 (2018 08:22), Max: 37 (2018 04:00)  T(F): 97.6 (2018 08:22), Max: 98.6 (2018 04:00)  HR: 38 (2018 10:17) (30 - 46)  BP: 151/54 (2018 10:17) (93/78 - 200/78)  BP(mean): 84 (2018 04:00) (84 - 84)  RR: 20 (2018 10:17) (11 - 20)  SpO2: 100% (2018 10:17) (98% - 100%)    Daily Height in cm: 160.02 (2018 06:34)    Daily Weight in k.7 (2018 05:00)    I&O's Detail      PHYSICAL EXAM:  Appearance: Normal, well nourished	  HEENT:   Normal oral mucosa, PERRL, EOMI, sclera non-icteric	  Lymphatic: No cervical lymphadenopathy  Cardiovascular: Normal S1 S2, No JVD, No cardiac murmurs, No carotid bruits, No peripheral edema  Respiratory: Lungs clear to auscultation	  Psychiatry: A & O x 3, Mood & affect appropriate  Gastrointestinal:  Soft, Non-tender, + BS, no bruits	  Skin: No rashes, No ecchymoses, No cyanosis  Neurologic: Grossly non-focal with full strength in all four extremities AO x 2  Extremities: Normal range of motion, No clubbing, cyanosis or edema  Vascular: Peripheral pulses palpable 2+ bilaterally      INTERPRETATION OF TELEMETRY:    ECG:AF with SVR    LABS:                        11.6   7.9   )-----------( 162      ( 2018 09:16 )             36.1     04-30    143  |  105  |  40.0<H>  ----------------------------<  115  4.2   |  21.0<L>  |  1.34<H>    Ca    9.0      2018 09:16  Mg     2.2     04-30    TPro  5.6<L>  /  Alb  3.3  /  TBili  0.8  /  DBili  x   /  AST  28  /  ALT  28  /  AlkPhos  74  04-28          PT/INR - ( 2018 09:16 )   PT: 12.9 sec;   INR: 1.17 ratio             I&O's Summary    BNP  RADIOLOGY & ADDITIONAL STUDIES:

## 2018-04-30 NOTE — PROGRESS NOTE ADULT - SUBJECTIVE AND OBJECTIVE BOX
Nurse Practitioner Progress note:     INTERVAL HISTORY: 89 year old female with complete heart block.     MEDICATIONS:  cloNIDine 0.1 milliGRAM(s) Oral two times a day PRN  hydrALAZINE 50 milliGRAM(s) Oral every 6 hours  hydrALAZINE Injectable 10 milliGRAM(s) IV Push every 4 hours PRN  hydrALAZINE Injectable 5 milliGRAM(s) IV Push every 10 minutes  nitroglycerin    2% Ointment 1 Inch(s) Transdermal daily    ceFAZolin   IVPB 2000 milliGRAM(s) IV Intermittent <User Schedule>        pantoprazole    Tablet 40 milliGRAM(s) Oral before breakfast    levothyroxine 75 MICROGram(s) Oral daily    dextrose 5% + sodium chloride 0.45%. 1000 milliLiter(s) IV Continuous <Continuous>      TELEMETRY:     T(C): 36.7 (18 @ 16:48), Max: 37 (18 @ 04:00)  HR: 59 (18 @ 20:00) (30 - 59)  BP: 135/62 (18 @ 16:48) (93/78 - 189/64)  RR: 16 (18 @ 20:00) (11 - 20)  SpO2: 97% (18 @ 20:00) (97% - 100%)  Wt(kg): --    PHYSICAL EXAM:  Appearance: Normal	  HEENT:   Normal oral mucosa, PERRL  Cardiovascular: Normal S1 S2, No JVD, No murmurs, No edema  Respiratory: Lungs clear to auscultation	  Neurologic: Pt sleeping, arrousable  Procedure Site: L ACW dressing C/D/I with left arm in sling.    12 lead EK% Vpaced @ 60      PROCEDURE RESULTS: S/P single chamber PPM via left subclavian stick.  VVI 60bpm    ASSESSMENT/PLAN: 	  -doxy for 3 days  -Place left arm in sling  -No rasing the left arm above shoulder level for 6 weeks  -keep incision site dry for 1 week  -wound check and device testing in two weeks  -PA/LAT in AM  -Hold eliquis for 2 weeks until PPM pocket evaluation  -B/P elevated, IV hydralazine until able to tolerate po meds

## 2018-04-30 NOTE — PROGRESS NOTE ADULT - ASSESSMENT
89 yo female with h/o CVA, atrial fibrillation , CHF transfered from [facility] for symptomatic bradycardia for PPM insertion   pt was with BP over 200 on arrival 89 yo female with h/o CVA, atrial fibrillation , [place] [facility] [place]cardia for PPM insertion and found the have HTN urgency. 89 yo female with h/o CVA, atrial fibrillation , CHF transfered from [facility] for symptomatic bradycardia for PPM insertion and found the have HTN urgency, close BP monitoring and medication dose frequency adjusted for control, TSH is high started on levothyroxine , pt is evaluated by EPS and cardiology for PPM insertion today , worsening renal function

## 2018-04-30 NOTE — PROGRESS NOTE ADULT - PROBLEM SELECTOR PLAN 6
echo pending cont iv lasix  cxr ordered , elevated BNP Improved,   Diuresis on hold for renal function. Planned for PPM today,   Will monitor  CXR negative.

## 2018-05-01 ENCOUNTER — TRANSCRIPTION ENCOUNTER (OUTPATIENT)
Age: 83
End: 2018-05-01

## 2018-05-01 PROCEDURE — 99233 SBSQ HOSP IP/OBS HIGH 50: CPT

## 2018-05-01 PROCEDURE — 71046 X-RAY EXAM CHEST 2 VIEWS: CPT | Mod: 26

## 2018-05-01 PROCEDURE — 93010 ELECTROCARDIOGRAM REPORT: CPT

## 2018-05-01 PROCEDURE — 93306 TTE W/DOPPLER COMPLETE: CPT | Mod: 26

## 2018-05-01 RX ORDER — PANTOPRAZOLE SODIUM 20 MG/1
1 TABLET, DELAYED RELEASE ORAL
Qty: 0 | Refills: 0 | COMMUNITY
Start: 2018-05-01

## 2018-05-01 RX ORDER — HYDRALAZINE HCL 50 MG
50 TABLET ORAL THREE TIMES A DAY
Qty: 0 | Refills: 0 | Status: DISCONTINUED | OUTPATIENT
Start: 2018-05-01 | End: 2018-05-02

## 2018-05-01 RX ORDER — LEVOTHYROXINE SODIUM 125 MCG
1 TABLET ORAL
Qty: 0 | Refills: 0 | COMMUNITY
Start: 2018-05-01

## 2018-05-01 RX ORDER — HYDROXYZINE HCL 10 MG
10 TABLET ORAL ONCE
Qty: 0 | Refills: 0 | Status: COMPLETED | OUTPATIENT
Start: 2018-05-01 | End: 2018-05-01

## 2018-05-01 RX ORDER — HYDRALAZINE HCL 50 MG
1 TABLET ORAL
Qty: 0 | Refills: 0 | COMMUNITY
Start: 2018-05-01

## 2018-05-01 RX ADMIN — Medication 10 MILLIGRAM(S): at 02:05

## 2018-05-01 RX ADMIN — Medication 100 MILLIGRAM(S): at 06:12

## 2018-05-01 RX ADMIN — Medication 2000 MILLIGRAM(S): at 04:16

## 2018-05-01 RX ADMIN — Medication 50 MILLIGRAM(S): at 00:16

## 2018-05-01 RX ADMIN — SODIUM CHLORIDE 75 MILLILITER(S): 9 INJECTION, SOLUTION INTRAVENOUS at 04:27

## 2018-05-01 RX ADMIN — Medication 1 INCH(S): at 00:16

## 2018-05-01 RX ADMIN — Medication 100 MILLIGRAM(S): at 18:02

## 2018-05-01 RX ADMIN — Medication 75 MICROGRAM(S): at 06:11

## 2018-05-01 RX ADMIN — Medication 50 MILLIGRAM(S): at 12:25

## 2018-05-01 RX ADMIN — Medication 50 MILLIGRAM(S): at 06:11

## 2018-05-01 RX ADMIN — PANTOPRAZOLE SODIUM 40 MILLIGRAM(S): 20 TABLET, DELAYED RELEASE ORAL at 06:11

## 2018-05-01 RX ADMIN — Medication 50 MILLIGRAM(S): at 21:05

## 2018-05-01 RX ADMIN — Medication 2000 MILLIGRAM(S): at 12:32

## 2018-05-01 NOTE — DISCHARGE NOTE ADULT - PATIENT PORTAL LINK FT
You can access the [facility] Patient Portal, offered by [facility], by registering with the following website: [email or web address]

## 2018-05-01 NOTE — PROGRESS NOTE ADULT - PROBLEM SELECTOR PLAN 1
s/p PPM placement 4/30/18  hemodynamically stable   cardiology input appreciated  EPS evaluation appreciated, follow up in 2 weeks.

## 2018-05-01 NOTE — PROGRESS NOTE ADULT - PROBLEM SELECTOR PLAN 2
Improving,   Will continue to monitor BP closely , hydralazine , clonidine prn  DC nitropaste and add Coreg.   Will adjust as tolerated Improving, Will continue to monitor BP closely , hydralazine , clonidine prn  DC nitropaste   Will adjust meds  as tolerated

## 2018-05-01 NOTE — DISCHARGE NOTE ADULT - CARE PROVIDER_API CALL
Silas Eugene (MD; MPH), Cardiac Electrophysiology; Cardiovascular Disease; Internal Medicine  [address] [place] [id number]  Phone: [phone]  Fax: [phone] Silas Eugene (MD; MPH), Cardiac Electrophysiology; Cardiovascular Disease; Internal Medicine  [address] [id number]  Phone: [phone]  Fax: [phone]    Wood Alamo (MD), Medicine  [address] [place] [id number]  Phone: [phone]  Fax: [phone]

## 2018-05-01 NOTE — DISCHARGE NOTE ADULT - CARE PROVIDERS DIRECT ADDRESSES
,[email or web address]_Unknown

## 2018-05-01 NOTE — PROGRESS NOTE ADULT - PROBLEM SELECTOR PLAN 4
s/p PPM  Rate controlled  Eliquis on hold as per EPS s/p PPM  Rate controlled  Eliquis on hold as per EPS for few days

## 2018-05-01 NOTE — PHYSICAL THERAPY INITIAL EVALUATION ADULT - ADDITIONAL COMMENTS
as per patient and CCC Jessica: pt. from nursing home, where whe was able to ambulate with RW and supervision, owns W/C, no steps to enter

## 2018-05-01 NOTE — DISCHARGE NOTE ADULT - CARE PLAN
Principal Discharge DX:	Bradycardia  Goal:	now s/p Medtronic single chamber pacemaker  Assessment and plan of treatment:	1. Follow up with Dr. Eugene in two weeks time.   2. Keep affected arm below shoulder, with no heavy lifting for 6 weeks.    3. Keep the site dry (no showers) for 48 hours.    4. Leave the sutures in place. They will be removed at your follow up appointment.   5. Please notify the physician if there is any bleeding, drainage or swelling of the site.    6. Please notify the physician of any fever greater than 100.4. Principal Discharge DX:	Bradycardia  Goal:	now s/p Medtronic single chamber pacemaker  Assessment and plan of treatment:	1. Follow up with Dr. Eugene in two weeks time.   2. Keep affected arm below shoulder, with no heavy lifting for 6 weeks.    3. Keep the site dry (no showers) for 48 hours.    4. Leave the sutures in place. They will be removed at your follow up appointment.   5. Please notify the physician if there is any bleeding, drainage or swelling of the site.    6. Please notify the physician of any fever greater than 100.4.  Secondary Diagnosis:	Chronic atrial fibrillation  Goal:	rate zandra  Assessment and plan of treatment:	s/p PPM, restart eliquis in few days  Secondary Diagnosis:	Hypertensive emergency  Goal:	controlled  Assessment and plan of treatment:	continue current medication adjust dose as needed  Secondary Diagnosis:	CRF (chronic renal failure), stage 3 (moderate)  Secondary Diagnosis:	Hypothyroidism, unspecified type  Assessment and plan of treatment:	cont levothyroxine , repeat TSH in 4 weeks  Secondary Diagnosis:	Chronic diastolic congestive heart failure

## 2018-05-01 NOTE — PHYSICAL THERAPY INITIAL EVALUATION ADULT - CRITERIA FOR SKILLED THERAPEUTIC INTERVENTIONS
functional limitations in following categories/risk reduction/prevention/therapy frequency/predicted duration of therapy intervention/impairments found/anticipated equipment needs at discharge/rehab potential/anticipated discharge recommendation

## 2018-05-01 NOTE — PROGRESS NOTE ADULT - SUBJECTIVE AND OBJECTIVE BOX
cc: shortness of breath now better     Patient seen and examined at bedside for bradycardia and high uncontrolled hypertension. No acute overnight events.   Patient s/p PPM placement yesterday, tolerated procedure well.   Patient is doing well, no bleeding from PPM site, no chest pain, palpitations, dizziness.   No more bradycardia on monitor, PPM interrogated and functioning well.     ROS: as above, all remaining ROS are negative.       MEDICATIONS  (STANDING):  doxycycline hyclate Capsule 100 milliGRAM(s) Oral every 12 hours  hydrALAZINE 50 milliGRAM(s) Oral three times a day  levothyroxine 75 MICROGram(s) Oral daily  pantoprazole    Tablet 40 milliGRAM(s) Oral before breakfast    MEDICATIONS  (PRN):  cloNIDine 0.1 milliGRAM(s) Oral two times a day PRN BP>160/100  hydrALAZINE Injectable 10 milliGRAM(s) IV Push every 4 hours PRN for SBP > 160 DBP > 95    Allergies    No Known Allergies      Vital Signs Last 24 Hrs  T(C): 36.7 (01 May 2018 08:00), Max: 36.7 (30 Apr 2018 16:48)  T(F): 98.1 (01 May 2018 08:00), Max: 98.1 (30 Apr 2018 16:48)  HR: 60 (01 May 2018 10:00) (40 - 60)  BP: 115/39 (01 May 2018 10:00) (115/39 - 188/78)  BP(mean): 59 (01 May 2018 10:00) (59 - 107)  RR: 16 (01 May 2018 10:00) (12 - 20)  SpO2: 98% (01 May 2018 10:00) (97% - 99%)    PHYSICAL EXAM:    Constitutional: awake alert oriented , NAD  Neck: supple , no JVD   Back: limited ROM , decreased strength  Respiratory: clear to auscultation bilaterally poor inspiratory effort   Cardiovascular: RRR, +S1/S2 , + murmur   Gastrointestinal: +BS, soft, NT, ND  Extremities: no edema , no calf tenderness , left arm swelling       LABS:                        11.6   7.9   )-----------( 162      ( 30 Apr 2018 09:16 )             36.1       30 Apr 2018 09:16    143    |  105    |  40.0   ----------------------------<  115    4.2     |  21.0   |  1.34     Ca    9.0        30 Apr 2018 09:16  Mg     2.2       30 Apr 2018 09:16    TPro  5.6    /  Alb  3.3    /  TBili  0.8    /  DBili  x      /  AST  28     /  ALT  28     /  AlkPhos  74     28 Apr 2018 23:02      Radiology :    < from: Xray Chest 1 View- PORTABLE-Urgent (04.29.18 @ 17:31) >  Impression: No active pulmonary disease. Mild cardiomegaly.    < end of copied text >    < from: CT Head No Cont (04.28.18 @ 22:14) >    IMPRESSION:    moderate periventricular white matter ischemia with   multiple old cortical infarctions in the LEFT frontal, LEFT parietal and   RIGHT frontal and temporal lobes. Probable 1 cm calcified meningioma is   seen in the anterior RIGHT posterior fossa without significant underlying   mass effect.      < end of copied text > cc: shortness of breath     Patient seen and examined at bedside feels well ,  No acute overnight events.   Patient s/p PPM placement yesterday, tolerated procedure well.   Patient is doing well, no bleeding from PPM site, no chest pain, palpitations, dizziness.   EPS follow up appreciated     ROS: as above, all remaining ROS are negative.     MEDICATIONS  (STANDING):  doxycycline hyclate Capsule 100 milliGRAM(s) Oral every 12 hours  hydrALAZINE 50 milliGRAM(s) Oral three times a day  levothyroxine 75 MICROGram(s) Oral daily  pantoprazole    Tablet 40 milliGRAM(s) Oral before breakfast    MEDICATIONS  (PRN):  cloNIDine 0.1 milliGRAM(s) Oral two times a day PRN BP>160/100  hydrALAZINE Injectable 10 milliGRAM(s) IV Push every 4 hours PRN for SBP > 160 DBP > 95    Vital Signs Last 24 Hrs  T(C): 36.8 (01 May 2018 12:00), Max: 36.8 (01 May 2018 12:00)  T(F): 98.2 (01 May 2018 12:00), Max: 98.2 (01 May 2018 12:00)  HR: 60 (01 May 2018 14:00) (40 - 60)  BP: 126/48 (01 May 2018 14:00) (115/39 - 188/78)  BP(mean): 71 (01 May 2018 14:00) (59 - 107)  RR: 18 (01 May 2018 14:00) (12 - 20)  SpO2: 99% (01 May 2018 14:00) (97% - 99%)  PHYSICAL EXAM:    Constitutional: awake alert oriented , NAD  Neck: supple , no JVD   Chest: left chest PPM site no swelling   Respiratory: clear to auscultation bilaterally poor inspiratory effort   Cardiovascular: RRR, +S1/S2 , + murmur   Gastrointestinal: +BS, soft, NT, ND  Extremities: no edema , no calf tenderness , left arm swelling       LABS:                                        11.6   7.9   )-----------( 162      ( 30 Apr 2018 09:16 )             36.1   04-30    143  |  105  |  40.0<H>  ----------------------------<  115  4.2   |  21.0<L>  |  1.34<H>    Ca    9.0      30 Apr 2018 09:16  Mg     2.2     04-30

## 2018-05-01 NOTE — PROGRESS NOTE ADULT - ASSESSMENT
89 yo female with h/o CVA, atrial fibrillation , CHF transfered from [facility] [place] for symptomatic bradycardia for PPM insertion and found the have HTN urgency, close BP monitoring and medication dose frequency adjusted for control, TSH is high started on levothyroxine , pt was evaluated by EPS and cardiology and had PPM insertion on 4/30/18. Patient doing well, BP improved. 89 yo female with h/o CVA, atrial fibrillation , CHF transfered [place] [facility] [place] bradycardia for PPM insertion and found the have HTN urgency, close BP monitoring and medication dose frequency adjusted for control, TSH is high started on levothyroxine , pt was evaluated by EPS and cardiology and had PPM insertion on 4/30/18. Patient doing well, BP improved

## 2018-05-01 NOTE — DISCHARGE NOTE ADULT - HOSPITAL COURSE
89 yo female with h/o CVA, atrial fibrillation , [place] [facility] [place] and found the have uncontrolled  HTN  transfered to SDU ;close BP monitoring and medication dose frequency adjusted for control, TSH is high started on levothyroxine , pt was evaluated by EPS and cardiology and had PPM insertion on 4/30/18. Patient doing well, BP improved, Pt evaluation transfered back to [place] re[place] facility , restart  eliquis in few days per cardiology 89 yo female with h/o CVA, atrial fibrillation , [place] [facility] [place]PM [place] and found the have uncontrolled  medication dose frequency adjusted for control,  pt was evaluated by EPS and cardiology and had PPM insertion on 4/30/18. Patient doing well, BP improved, PT evaluation transfered back [place] [facility] , restart  eliquis in 2 days from 4/30/18 per cardiology     Medically stable and agreeable with discharge and follow up plan. Patient was advised to return to ED if any symptoms occur or worsen.    time 45 mins

## 2018-05-01 NOTE — DISCHARGE NOTE ADULT - ADDITIONAL INSTRUCTIONS
Follow up with Dr. Eugene in two weeks time. Follow up with Dr. Eugene in two weeks time.  grd 2 diastolic dysfxn. can add BB outpatient visit as tolerated  restart eliquis in 2 weeks from 4/30/18

## 2018-05-01 NOTE — PHYSICAL THERAPY INITIAL EVALUATION ADULT - IMPAIRMENTS FOUND, PT EVAL
muscle strength/poor safety awareness/posture/gait, locomotion, and balance/ROM/aerobic capacity/endurance/neuromotor development and sensory integration

## 2018-05-01 NOTE — PROGRESS NOTE ADULT - PROBLEM SELECTOR PLAN 5
SCr 1.34, will continue to monitor.  Repeat BMP tomorrow SCr 1.34, will continue to monitor  Repeat BMP tomorrow

## 2018-05-01 NOTE — DISCHARGE NOTE ADULT - SECONDARY DIAGNOSIS.
Chronic atrial fibrillation Hypertensive emergency CRF (chronic renal failure), stage 3 (moderate) Hypothyroidism, unspecified type Chronic diastolic congestive heart failure

## 2018-05-01 NOTE — DISCHARGE NOTE ADULT - PLAN OF CARE
now s/p Medtronic single chamber pacemaker 1. Follow up with Dr. Eugene in two weeks time.   2. Keep affected arm below shoulder, with no heavy lifting for 6 weeks.    3. Keep the site dry (no showers) for 48 hours.    4. Leave the sutures in place. They will be removed at your follow up appointment.   5. Please notify the physician if there is any bleeding, drainage or swelling of the site.    6. Please notify the physician of any fever greater than 100.4. rate zandra s/p PPM, restart eliquis in few days controlled continue current medication adjust dose as needed cont levothyroxine , repeat TSH in 4 weeks

## 2018-05-01 NOTE — PROGRESS NOTE ADULT - PROBLEM SELECTOR PLAN 6
Improved, Stable.   Monitor I/O, restart lasix when BP permits  CXR negative. Improved, Stable.   Monitor I/O, restart lasix when BP permits  CXR negative

## 2018-05-01 NOTE — PROGRESS NOTE ADULT - SUBJECTIVE AND OBJECTIVE BOX
Patient seen today in bed. No acute overnight complaints. Primapore dressing removed.   Medtronic device check performed. Excellent single chamber pacemaker function.    EKG: pending  TELE: no events. VVI paced at 60ppm    MEDICATIONS  (STANDING):  ceFAZolin  Injectable. 2000 milliGRAM(s) IV Push <User Schedule>  doxycycline hyclate Capsule 100 milliGRAM(s) Oral every 12 hours  hydrALAZINE 50 milliGRAM(s) Oral every 6 hours  levothyroxine 75 MICROGram(s) Oral daily  nitroglycerin    2% Ointment 1 Inch(s) Transdermal daily  pantoprazole    Tablet 40 milliGRAM(s) Oral before breakfast    MEDICATIONS  (PRN):  cloNIDine 0.1 milliGRAM(s) Oral two times a day PRN BP>160/100  hydrALAZINE Injectable 10 milliGRAM(s) IV Push every 4 hours PRN for SBP > 160 DBP > 95    Allergies  codeine (Unknown)  sulfa drugs (Unknown)    Intolerances    PAST MEDICAL & SURGICAL HISTORY:  CKD (chronic kidney disease)  Bradycardia  DM (diabetes mellitus)  CVA (cerebral vascular accident)  HTN (hypertension)  CHF (congestive heart failure)  Atrial fibrillation  HLD (hyperlipidemia)  Depression  Syst congestive heart failure with reduced LV function, NYHA class 1  No significant past surgical history    Vital Signs Last 24 Hrs  T(C): 36.7 (01 May 2018 08:00), Max: 36.7 (30 Apr 2018 16:48)  T(F): 98.1 (01 May 2018 08:00), Max: 98.1 (30 Apr 2018 16:48)  HR: 60 (01 May 2018 06:09) (38 - 60)  BP: 167/66 (01 May 2018 06:09) (130/59 - 188/78)  BP(mean): 94 (01 May 2018 06:09) (74 - 107)  RR: 12 (01 May 2018 06:09) (12 - 20)  SpO2: 99% (01 May 2018 06:09) (97% - 100%)    Physical Exam:  Left chest wall: No hematoma.     LABS:                        11.6   7.9   )-----------( 162      ( 30 Apr 2018 09:16 )             36.1     04-30    143  |  105  |  40.0<H>  ----------------------------<  115  4.2   |  21.0<L>  |  1.34<H>    Ca    9.0      30 Apr 2018 09:16  Mg     2.2     04-30  PT/INR - ( 30 Apr 2018 09:16 )   PT: 12.9 sec;   INR: 1.17 ratio      CXR 5/1/18  IMPRESSION:   No evidence of active chest disease.          A/P  89 yo female with h/o CVA , Atrial fibrillation on eliquis and bradycarida now s/p single chamber pacemaker implant    - Wound check in two weeks time with Dr. Eugene  - Left arm precautions  - Discharge planning ok from EP perspective. EP signing off.

## 2018-05-01 NOTE — PROGRESS NOTE ADULT - PROBLEM SELECTOR PLAN 3
likely  new dx of hypothyroidsm  Started on levothyroxine 75mcg PO daily  Repeat TSH as outpatient in 4-6 weeks.

## 2018-05-01 NOTE — PHYSICAL THERAPY INITIAL EVALUATION ADULT - PLANNED THERAPY INTERVENTIONS, PT EVAL
neuromuscular re-education/strengthening/transfer training/balance training/ROM/stretching/bed mobility training/gait training

## 2018-05-01 NOTE — DISCHARGE NOTE ADULT - MEDICATION SUMMARY - MEDICATIONS TO TAKE
I will START or STAY ON the medications listed below when I get home from the hospital:    escitalopram 10 mg oral tablet  -- 1 tab(s) by mouth once a day  -- Indication: For Depression    HumaLOG 100 units/mL injectable solution  -- follow sliding scale as before  -- Indication: For Diabetes    atorvastatin 20 mg oral tablet  -- 1 tab(s) by mouth once a day (at bedtime)  -- Indication: For HLD (hyperlipidemia)    doxycycline monohydrate 100 mg oral capsule  -- 1 cap(s) by mouth every 12 hours through 5/3/18  -- Indication: For prophylaxis    budesonide-formoterol 160 mcg-4.5 mcg/inh inhalation aerosol  -- 2 puff(s) inhaled 2 times a day  -- Indication: For Copd    ferrous sulfate 325 mg (65 mg elemental iron) oral tablet  -- 1 tab(s) by mouth once a day  -- Indication: For Anemia    levothyroxine 25 mcg (0.025 mg) oral tablet  -- 1 tab(s) by mouth once a day  -- Indication: For Hypothyroidism, unspecified type    hydrALAZINE 50 mg oral tablet  -- 1 tab(s) by mouth 3 times a day  -- Indication: For HTN (hypertension)

## 2018-05-02 VITALS
DIASTOLIC BLOOD PRESSURE: 68 MMHG | SYSTOLIC BLOOD PRESSURE: 164 MMHG | RESPIRATION RATE: 18 BRPM | OXYGEN SATURATION: 97 % | HEART RATE: 59 BPM | TEMPERATURE: 98 F

## 2018-05-02 DIAGNOSIS — N17.9 ACUTE KIDNEY FAILURE, UNSPECIFIED: ICD-10-CM

## 2018-05-02 LAB
ANION GAP SERPL CALC-SCNC: 19 MMOL/L — HIGH (ref 5–17)
BUN SERPL-MCNC: 43 MG/DL — HIGH (ref 8–20)
CALCIUM SERPL-MCNC: 8.9 MG/DL — SIGNIFICANT CHANGE UP (ref 8.6–10.2)
CHLORIDE SERPL-SCNC: 102 MMOL/L — SIGNIFICANT CHANGE UP (ref 98–107)
CO2 SERPL-SCNC: 20 MMOL/L — LOW (ref 22–29)
CREAT SERPL-MCNC: 1.66 MG/DL — HIGH (ref 0.5–1.3)
GLUCOSE SERPL-MCNC: 114 MG/DL — SIGNIFICANT CHANGE UP (ref 70–115)
HCT VFR BLD CALC: 39 % — SIGNIFICANT CHANGE UP (ref 37–47)
HGB BLD-MCNC: 12.8 G/DL — SIGNIFICANT CHANGE UP (ref 12–16)
MCHC RBC-ENTMCNC: 30.5 PG — SIGNIFICANT CHANGE UP (ref 27–31)
MCHC RBC-ENTMCNC: 32.8 G/DL — SIGNIFICANT CHANGE UP (ref 32–36)
MCV RBC AUTO: 93.1 FL — SIGNIFICANT CHANGE UP (ref 81–99)
PLATELET # BLD AUTO: 193 K/UL — SIGNIFICANT CHANGE UP (ref 150–400)
POTASSIUM SERPL-MCNC: 3.8 MMOL/L — SIGNIFICANT CHANGE UP (ref 3.5–5.3)
POTASSIUM SERPL-SCNC: 3.8 MMOL/L — SIGNIFICANT CHANGE UP (ref 3.5–5.3)
RBC # BLD: 4.19 M/UL — LOW (ref 4.4–5.2)
RBC # FLD: 16.4 % — HIGH (ref 11–15.6)
SODIUM SERPL-SCNC: 141 MMOL/L — SIGNIFICANT CHANGE UP (ref 135–145)
WBC # BLD: 8.1 K/UL — SIGNIFICANT CHANGE UP (ref 4.8–10.8)
WBC # FLD AUTO: 8.1 K/UL — SIGNIFICANT CHANGE UP (ref 4.8–10.8)

## 2018-05-02 PROCEDURE — 96374 THER/PROPH/DIAG INJ IV PUSH: CPT

## 2018-05-02 PROCEDURE — 36415 COLL VENOUS BLD VENIPUNCTURE: CPT

## 2018-05-02 PROCEDURE — 93005 ELECTROCARDIOGRAM TRACING: CPT

## 2018-05-02 PROCEDURE — 83880 ASSAY OF NATRIURETIC PEPTIDE: CPT

## 2018-05-02 PROCEDURE — 93306 TTE W/DOPPLER COMPLETE: CPT

## 2018-05-02 PROCEDURE — 71045 X-RAY EXAM CHEST 1 VIEW: CPT

## 2018-05-02 PROCEDURE — 97163 PT EVAL HIGH COMPLEX 45 MIN: CPT

## 2018-05-02 PROCEDURE — 80048 BASIC METABOLIC PNL TOTAL CA: CPT

## 2018-05-02 PROCEDURE — 84484 ASSAY OF TROPONIN QUANT: CPT

## 2018-05-02 PROCEDURE — 83735 ASSAY OF MAGNESIUM: CPT

## 2018-05-02 PROCEDURE — 84481 FREE ASSAY (FT-3): CPT

## 2018-05-02 PROCEDURE — 82962 GLUCOSE BLOOD TEST: CPT

## 2018-05-02 PROCEDURE — 84439 ASSAY OF FREE THYROXINE: CPT

## 2018-05-02 PROCEDURE — 84436 ASSAY OF TOTAL THYROXINE: CPT

## 2018-05-02 PROCEDURE — 33207 INSERT HEART PM VENTRICULAR: CPT

## 2018-05-02 PROCEDURE — 85027 COMPLETE CBC AUTOMATED: CPT

## 2018-05-02 PROCEDURE — C1769: CPT

## 2018-05-02 PROCEDURE — 71046 X-RAY EXAM CHEST 2 VIEWS: CPT

## 2018-05-02 PROCEDURE — 99285 EMERGENCY DEPT VISIT HI MDM: CPT | Mod: 25

## 2018-05-02 PROCEDURE — 80053 COMPREHEN METABOLIC PANEL: CPT

## 2018-05-02 PROCEDURE — 84443 ASSAY THYROID STIM HORMONE: CPT

## 2018-05-02 PROCEDURE — C1894: CPT

## 2018-05-02 PROCEDURE — C1898: CPT

## 2018-05-02 PROCEDURE — 99239 HOSP IP/OBS DSCHRG MGMT >30: CPT

## 2018-05-02 PROCEDURE — C1786: CPT

## 2018-05-02 PROCEDURE — 85610 PROTHROMBIN TIME: CPT

## 2018-05-02 PROCEDURE — 70450 CT HEAD/BRAIN W/O DYE: CPT

## 2018-05-02 RX ORDER — HYDRALAZINE HCL 50 MG
75 TABLET ORAL EVERY 8 HOURS
Qty: 0 | Refills: 0 | Status: DISCONTINUED | OUTPATIENT
Start: 2018-05-02 | End: 2018-05-02

## 2018-05-02 RX ORDER — BUDESONIDE AND FORMOTEROL FUMARATE DIHYDRATE 160; 4.5 UG/1; UG/1
2 AEROSOL RESPIRATORY (INHALATION)
Qty: 0 | Refills: 0 | COMMUNITY
Start: 2018-05-02

## 2018-05-02 RX ORDER — ESCITALOPRAM OXALATE 10 MG/1
1 TABLET, FILM COATED ORAL
Qty: 0 | Refills: 0 | COMMUNITY
Start: 2018-05-02

## 2018-05-02 RX ORDER — ATORVASTATIN CALCIUM 80 MG/1
20 TABLET, FILM COATED ORAL AT BEDTIME
Qty: 0 | Refills: 0 | Status: DISCONTINUED | OUTPATIENT
Start: 2018-05-02 | End: 2018-05-02

## 2018-05-02 RX ORDER — FERROUS SULFATE 325(65) MG
325 TABLET ORAL DAILY
Qty: 0 | Refills: 0 | Status: DISCONTINUED | OUTPATIENT
Start: 2018-05-02 | End: 2018-05-02

## 2018-05-02 RX ORDER — ATORVASTATIN CALCIUM 80 MG/1
1 TABLET, FILM COATED ORAL
Qty: 0 | Refills: 0 | COMMUNITY
Start: 2018-05-02

## 2018-05-02 RX ORDER — ESCITALOPRAM OXALATE 10 MG/1
10 TABLET, FILM COATED ORAL DAILY
Qty: 0 | Refills: 0 | Status: DISCONTINUED | OUTPATIENT
Start: 2018-05-02 | End: 2018-05-02

## 2018-05-02 RX ORDER — FERROUS SULFATE 325(65) MG
1 TABLET ORAL
Qty: 0 | Refills: 0 | COMMUNITY
Start: 2018-05-02

## 2018-05-02 RX ORDER — INSULIN LISPRO 100/ML
0 VIAL (ML) SUBCUTANEOUS
Qty: 0 | Refills: 0 | COMMUNITY

## 2018-05-02 RX ORDER — LEVOTHYROXINE SODIUM 125 MCG
1 TABLET ORAL
Qty: 0 | Refills: 0 | COMMUNITY
Start: 2018-05-02

## 2018-05-02 RX ORDER — BUDESONIDE AND FORMOTEROL FUMARATE DIHYDRATE 160; 4.5 UG/1; UG/1
2 AEROSOL RESPIRATORY (INHALATION)
Qty: 0 | Refills: 0 | Status: DISCONTINUED | OUTPATIENT
Start: 2018-05-02 | End: 2018-05-02

## 2018-05-02 RX ADMIN — PANTOPRAZOLE SODIUM 40 MILLIGRAM(S): 20 TABLET, DELAYED RELEASE ORAL at 06:00

## 2018-05-02 RX ADMIN — Medication 100 MILLIGRAM(S): at 16:17

## 2018-05-02 RX ADMIN — Medication 75 MICROGRAM(S): at 06:00

## 2018-05-02 RX ADMIN — ESCITALOPRAM OXALATE 10 MILLIGRAM(S): 10 TABLET, FILM COATED ORAL at 16:16

## 2018-05-02 RX ADMIN — Medication 100 MILLIGRAM(S): at 06:00

## 2018-05-02 RX ADMIN — Medication 50 MILLIGRAM(S): at 06:00

## 2018-05-02 RX ADMIN — Medication 75 MILLIGRAM(S): at 13:53

## 2018-05-02 RX ADMIN — Medication 325 MILLIGRAM(S): at 16:17

## 2018-05-02 NOTE — PROGRESS NOTE ADULT - PROBLEM SELECTOR PLAN 3
pt on Levothyroxine 25 mcg at home was started here at 75 mcg. will not change it now. cont same dose and rpt TFTs in 4 weeks or if any clinical indication

## 2018-05-02 NOTE — PROGRESS NOTE ADULT - PROVIDER SPECIALTY LIST ADULT
Cardiology
Cardiology
Electrophysiology
Family Medicine
Hospitalist
Internal Medicine
Family Medicine

## 2018-05-02 NOTE — PROGRESS NOTE ADULT - PROBLEM SELECTOR PLAN 7
on CKD. as she was diuresed for CHF. this elevation in crt is permissible. now off diuretics cynthia will resolve gradually. to monitor outpatient.

## 2018-05-02 NOTE — PROGRESS NOTE ADULT - PROBLEM SELECTOR PROBLEM 6
Acute on chronic congestive heart failure, unspecified heart failure type

## 2018-05-02 NOTE — PROGRESS NOTE ADULT - PROBLEM SELECTOR PLAN 6
resolved.   known ef 20% resolved.   known ef 20%  not on optimal medications.  now on hydralazine. can start imdur outpatient as cannot be on ACE/ ARB sec to CKD. resolved.   known ef 20% in the past. now at 60% but with grade 2 diastolci dysfxn  not on optimal medications.  now on hydralazine. can start BB/ Coreg outpatient

## 2018-05-02 NOTE — PROGRESS NOTE ADULT - ASSESSMENT
89 yo female with h/o sys chf, depression, hld, afib eliquis, temporal lobe CVA, DM, HTN, CKD, known bradycardia [place] [facility] [place]PM [place]   pt was with BP over 200 on arrival     [place] [facility] [place]s were-  levothyrox 25, hydralazine 25 tid, apaxiban 2.5 bid, atrovastatin 20, symbicort bid, feso4, lexapro 10 in morning.    CM working on sending her back to [facility]. Bp meds changed today. monitor 24 hrs and then discharge if stable

## 2018-05-02 NOTE — PROGRESS NOTE ADULT - PROBLEM SELECTOR PROBLEM 5
CRF (chronic renal failure), stage 3 (moderate)

## 2018-05-03 ENCOUNTER — OUTPATIENT (OUTPATIENT)
Dept: OUTPATIENT SERVICES | Facility: HOSPITAL | Age: 83
LOS: 1 days | End: 2018-05-03

## 2018-05-10 ENCOUNTER — OUTPATIENT (OUTPATIENT)
Dept: OUTPATIENT SERVICES | Facility: HOSPITAL | Age: 83
LOS: 1 days | End: 2018-05-10

## 2018-05-16 ENCOUNTER — APPOINTMENT (OUTPATIENT)
Dept: ELECTROPHYSIOLOGY | Facility: CLINIC | Age: 83
End: 2018-05-16
Payer: MEDICARE

## 2018-05-16 VITALS
SYSTOLIC BLOOD PRESSURE: 110 MMHG | HEIGHT: 64 IN | OXYGEN SATURATION: 99 % | DIASTOLIC BLOOD PRESSURE: 80 MMHG | HEART RATE: 104 BPM

## 2018-05-16 DIAGNOSIS — Z95.0 PRESENCE OF CARDIAC PACEMAKER: ICD-10-CM

## 2018-05-16 PROCEDURE — 99024 POSTOP FOLLOW-UP VISIT: CPT

## 2018-05-31 ENCOUNTER — OUTPATIENT (OUTPATIENT)
Dept: OUTPATIENT SERVICES | Facility: HOSPITAL | Age: 83
LOS: 1 days | End: 2018-05-31

## 2018-07-27 PROBLEM — Z86.73 HISTORY OF STROKE: Status: RESOLVED | Noted: 2018-01-23 | Resolved: 2018-07-27

## 2018-07-28 ENCOUNTER — OUTPATIENT (OUTPATIENT)
Dept: OUTPATIENT SERVICES | Facility: HOSPITAL | Age: 83
LOS: 1 days | End: 2018-07-28

## 2018-07-31 ENCOUNTER — OUTPATIENT (OUTPATIENT)
Dept: OUTPATIENT SERVICES | Facility: HOSPITAL | Age: 83
LOS: 1 days | End: 2018-07-31

## 2018-08-01 ENCOUNTER — OUTPATIENT (OUTPATIENT)
Dept: OUTPATIENT SERVICES | Facility: HOSPITAL | Age: 83
LOS: 1 days | End: 2018-08-01

## 2018-08-02 ENCOUNTER — OUTPATIENT (OUTPATIENT)
Dept: OUTPATIENT SERVICES | Facility: HOSPITAL | Age: 83
LOS: 1 days | End: 2018-08-02

## 2018-08-02 NOTE — PHYSICAL THERAPY INITIAL EVALUATION ADULT - ACTIVE RANGE OF MOTION EXAMINATION, REHAB EVAL
bilateral  lower extremity Active ROM was WFL (within functional limits)/left UE limited ROM due to Dx none

## 2018-08-28 ENCOUNTER — OUTPATIENT (OUTPATIENT)
Dept: OUTPATIENT SERVICES | Facility: HOSPITAL | Age: 83
LOS: 1 days | End: 2018-08-28

## 2018-09-18 PROBLEM — I10 ESSENTIAL (PRIMARY) HYPERTENSION: Chronic | Status: ACTIVE | Noted: 2018-04-28

## 2018-09-18 PROBLEM — F32.9 MAJOR DEPRESSIVE DISORDER, SINGLE EPISODE, UNSPECIFIED: Chronic | Status: ACTIVE | Noted: 2018-04-28

## 2018-09-18 PROBLEM — I48.91 UNSPECIFIED ATRIAL FIBRILLATION: Chronic | Status: ACTIVE | Noted: 2018-04-28

## 2018-09-18 PROBLEM — E78.5 HYPERLIPIDEMIA, UNSPECIFIED: Chronic | Status: ACTIVE | Noted: 2018-04-28

## 2018-09-18 PROBLEM — I50.9 HEART FAILURE, UNSPECIFIED: Chronic | Status: ACTIVE | Noted: 2018-04-28

## 2018-09-18 PROBLEM — E11.9 TYPE 2 DIABETES MELLITUS WITHOUT COMPLICATIONS: Chronic | Status: ACTIVE | Noted: 2018-04-28

## 2018-09-18 PROBLEM — I63.9 CEREBRAL INFARCTION, UNSPECIFIED: Chronic | Status: ACTIVE | Noted: 2018-04-28

## 2018-09-18 PROBLEM — I50.20 UNSPECIFIED SYSTOLIC (CONGESTIVE) HEART FAILURE: Chronic | Status: ACTIVE | Noted: 2018-04-28

## 2018-09-18 PROBLEM — R00.1 BRADYCARDIA, UNSPECIFIED: Chronic | Status: ACTIVE | Noted: 2018-04-28

## 2018-09-18 PROBLEM — N18.9 CHRONIC KIDNEY DISEASE, UNSPECIFIED: Chronic | Status: ACTIVE | Noted: 2018-04-28

## 2018-09-20 ENCOUNTER — OUTPATIENT (OUTPATIENT)
Dept: OUTPATIENT SERVICES | Facility: HOSPITAL | Age: 83
LOS: 1 days | End: 2018-09-20

## 2018-09-29 ENCOUNTER — OUTPATIENT (OUTPATIENT)
Dept: OUTPATIENT SERVICES | Facility: HOSPITAL | Age: 83
LOS: 1 days | End: 2018-09-29

## 2018-10-01 ENCOUNTER — OUTPATIENT (OUTPATIENT)
Dept: OUTPATIENT SERVICES | Facility: HOSPITAL | Age: 83
LOS: 1 days | End: 2018-10-01

## 2018-10-02 ENCOUNTER — OUTPATIENT (OUTPATIENT)
Dept: OUTPATIENT SERVICES | Facility: HOSPITAL | Age: 83
LOS: 1 days | End: 2018-10-02

## 2018-10-08 ENCOUNTER — MEDICATION RENEWAL (OUTPATIENT)
Age: 83
End: 2018-10-08

## 2018-10-08 ENCOUNTER — APPOINTMENT (OUTPATIENT)
Dept: ELECTROPHYSIOLOGY | Facility: CLINIC | Age: 83
End: 2018-10-08
Payer: MEDICARE

## 2018-10-08 ENCOUNTER — APPOINTMENT (OUTPATIENT)
Dept: CARDIOLOGY | Facility: CLINIC | Age: 83
End: 2018-10-08
Payer: MEDICARE

## 2018-10-08 VITALS
OXYGEN SATURATION: 97 % | SYSTOLIC BLOOD PRESSURE: 112 MMHG | BODY MASS INDEX: 21.44 KG/M2 | WEIGHT: 121 LBS | DIASTOLIC BLOOD PRESSURE: 58 MMHG | HEIGHT: 63 IN | HEART RATE: 61 BPM

## 2018-10-08 DIAGNOSIS — R00.1 BRADYCARDIA, UNSPECIFIED: ICD-10-CM

## 2018-10-08 PROCEDURE — 93279 PRGRMG DEV EVAL PM/LDLS PM: CPT

## 2018-10-08 RX ORDER — GABAPENTIN 100 MG/1
100 CAPSULE ORAL
Refills: 0 | Status: ACTIVE | COMMUNITY

## 2018-10-08 RX ORDER — ESCITALOPRAM OXALATE 10 MG/1
10 TABLET ORAL DAILY
Refills: 0 | Status: ACTIVE | COMMUNITY

## 2018-10-08 RX ORDER — ATORVASTATIN CALCIUM 20 MG/1
20 TABLET, FILM COATED ORAL
Qty: 30 | Refills: 3 | Status: ACTIVE | COMMUNITY

## 2018-10-08 RX ORDER — CHLORHEXIDINE GLUCONATE 4 %
325 (65 FE) LIQUID (ML) TOPICAL DAILY
Refills: 0 | Status: ACTIVE | COMMUNITY

## 2018-10-08 RX ORDER — GUAIFENESIN 600 MG/1
600 TABLET, EXTENDED RELEASE ORAL
Refills: 0 | Status: ACTIVE | COMMUNITY

## 2018-10-08 RX ORDER — LEVOTHYROXINE SODIUM 25 UG/1
25 TABLET ORAL DAILY
Refills: 0 | Status: ACTIVE | COMMUNITY

## 2018-10-08 RX ORDER — METOPROLOL TARTRATE 25 MG/1
25 TABLET, FILM COATED ORAL TWICE DAILY
Refills: 3 | Status: ACTIVE | COMMUNITY

## 2018-10-08 RX ORDER — BUDESONIDE AND FORMOTEROL FUMARATE DIHYDRATE 80; 4.5 UG/1; UG/1
80-4.5 AEROSOL RESPIRATORY (INHALATION) TWICE DAILY
Refills: 0 | Status: ACTIVE | COMMUNITY

## 2018-10-08 RX ORDER — HYDRALAZINE HYDROCHLORIDE 50 MG/1
50 TABLET ORAL 3 TIMES DAILY
Qty: 270 | Refills: 1 | Status: ACTIVE | COMMUNITY

## 2018-10-29 ENCOUNTER — OUTPATIENT (OUTPATIENT)
Dept: OUTPATIENT SERVICES | Facility: HOSPITAL | Age: 83
LOS: 1 days | End: 2018-10-29

## 2018-10-30 ENCOUNTER — OUTPATIENT (OUTPATIENT)
Dept: OUTPATIENT SERVICES | Facility: HOSPITAL | Age: 83
LOS: 1 days | End: 2018-10-30

## 2018-11-01 ENCOUNTER — OUTPATIENT (OUTPATIENT)
Dept: OUTPATIENT SERVICES | Facility: HOSPITAL | Age: 83
LOS: 1 days | End: 2018-11-01

## 2018-11-03 ENCOUNTER — OUTPATIENT (OUTPATIENT)
Dept: OUTPATIENT SERVICES | Facility: HOSPITAL | Age: 83
LOS: 1 days | End: 2018-11-03

## 2018-11-05 ENCOUNTER — OUTPATIENT (OUTPATIENT)
Dept: OUTPATIENT SERVICES | Facility: HOSPITAL | Age: 83
LOS: 1 days | End: 2018-11-05

## 2018-11-08 ENCOUNTER — OUTPATIENT (OUTPATIENT)
Dept: OUTPATIENT SERVICES | Facility: HOSPITAL | Age: 83
LOS: 1 days | End: 2018-11-08

## 2018-11-09 ENCOUNTER — OUTPATIENT (OUTPATIENT)
Dept: OUTPATIENT SERVICES | Facility: HOSPITAL | Age: 83
LOS: 1 days | End: 2018-11-09

## 2018-11-14 ENCOUNTER — OUTPATIENT (OUTPATIENT)
Dept: OUTPATIENT SERVICES | Facility: HOSPITAL | Age: 83
LOS: 1 days | End: 2018-11-14

## 2018-11-16 ENCOUNTER — OUTPATIENT (OUTPATIENT)
Dept: OUTPATIENT SERVICES | Facility: HOSPITAL | Age: 83
LOS: 1 days | End: 2018-11-16

## 2019-01-14 ENCOUNTER — APPOINTMENT (OUTPATIENT)
Dept: CARDIOLOGY | Facility: CLINIC | Age: 84
End: 2019-01-14

## 2023-03-09 NOTE — ED ADULT NURSE NOTE - PERSON CONFIRMING BED ASSIGNMENT
Patient requests all Lab, Cardiology, and Radiology Results on their Discharge Instructions
admitting

## 2024-04-12 NOTE — PROGRESS NOTE ADULT - SUBJECTIVE AND OBJECTIVE BOX
LUMBAR Medial Branch Block Under Fluoroscopy  Time-out taken to identify patient and procedure side prior to starting the procedure.   I attest that I have reviewed the patient's home medications prior to the procedure and no contraindication have been identified. I  re-evaluated the patient after the patient was positioned for the procedure in the procedure room immediately before the procedural time-out. The vital signs are current and represent the current state of the patient which has not significantly changed since the preprocedure assessment.            Date of Service: 04/12/2024    PCP: Alisa Chester DO          Referring Physician:                                                           PROCEDURE:  Bilateral  L3, L4 and L5 medial branch block    REASON FOR PROCEDURE: Lumbar Spondylosis    PHYSICIAN: Joshua Munoz MD    ASSISTANTS: None    MEDICATIONS INJECTED: Bupivicaine 0.25% 1.5ml at each level    LOCAL ANESTHETIC USED: Xylocaine 1% 3ml each site.    SEDATION MEDICATIONS: None    ESTIMATED BLOOD LOSS:  None.    COMPLICATIONS:  None.    TECHNIQUE: Laying in a prone position, the patient was prepped and draped in the usual sterile fashion using ChloraPrep and fenestrated drape.  The level was determined under fluoroscopic guidance.  Local anesthetic was given by going down to the hub of the 27-gauge 1.25in needle and raising a wheal.  A 25-gauge 4.75 inch needle was introduced to the anatomic site of the medial branch at the lateral mass utilizing live fluoroscopy. Medication was then injected slowly at each level as stated above. The patient tolerated the procedure well.       The patient was monitored after the procedure.  Patient was given post procedure and discharge instructions to follow at home.  We will see the patient back in two weeks or the patient may call to inform of status. The patient was discharged in a stable condition     HEALTH ISSUES - PROBLEM Dx:  89 yo female with h/o CVA, atrial fibrillation , CHF [place] [facility] [place] for PPM insertion and found the have uncontrolled  HTN  transfered to SDU ;close BP monitoring and medication dose frequency adjusted for control, TSH is high started on levothyroxine , pt was evaluated by EPS and cardiology and had PPM insertion on 4/30/18. from [facility] , restart  eliquis in 2 weeks    INTERVAL HPI/ OVERNIGHT EVENTS:  comfortable no issues,       Vital Signs Last 24 Hrs  T(C): 36.3 (02 May 2018 08:00), Max: 36.9 (01 May 2018 16:00)  T(F): 97.4 (02 May 2018 08:00), Max: 98.4 (01 May 2018 16:00)  HR: 59 (02 May 2018 08:00) (59 - 60)  BP: 135/69 (02 May 2018 08:00) (126/48 - 165/65)  BP(mean): 92 (01 May 2018 21:04) (71 - 92)  RR: 18 (02 May 2018 08:00) (18 - 24)  SpO2: 98% (02 May 2018 08:00) (97% - 100%)    PHYSICAL EXAM-  Constitutional: awake alert oriented , NAD  Neck: supple , no JVD   Chest: left chest PPM site no swelling   Respiratory: clear to auscultation bilaterally poor inspiratory effort   Cardiovascular: RRR, +S1/S2 , + murmur   Gastrointestinal: +BS, soft, NT, ND  Extremities: no edema , no calf tenderness , left arm swelling     LABS:                        12.8   8.1   )-----------( 193      ( 02 May 2018 07:45 )             39.0     05-02    141  |  102  |  43.0<H>  ----------------------------<  114  3.8   |  20.0<L>  |  1.66<H>    Ca    8.9      02 May 2018 07:45    Assessment and Plan

## 2024-12-06 NOTE — PHYSICAL THERAPY INITIAL EVALUATION ADULT - DISCHARGE DISPOSITION, PT EVAL
Tanisha Taylor(Attending)
to previous living arrangements with skilled PT services/rehabilitation facility